# Patient Record
Sex: FEMALE | Race: NATIVE HAWAIIAN OR OTHER PACIFIC ISLANDER | NOT HISPANIC OR LATINO | Employment: UNEMPLOYED | URBAN - METROPOLITAN AREA
[De-identification: names, ages, dates, MRNs, and addresses within clinical notes are randomized per-mention and may not be internally consistent; named-entity substitution may affect disease eponyms.]

---

## 2018-08-06 ENCOUNTER — HOSPITAL ENCOUNTER (INPATIENT)
Facility: MEDICAL CENTER | Age: 48
LOS: 4 days | DRG: 871 | End: 2018-08-10
Attending: EMERGENCY MEDICINE | Admitting: INTERNAL MEDICINE
Payer: MEDICAID

## 2018-08-06 ENCOUNTER — APPOINTMENT (OUTPATIENT)
Dept: RADIOLOGY | Facility: MEDICAL CENTER | Age: 48
DRG: 871 | End: 2018-08-06
Attending: EMERGENCY MEDICINE
Payer: MEDICAID

## 2018-08-06 DIAGNOSIS — J96.01 ACUTE RESPIRATORY FAILURE WITH HYPOXIA (HCC): ICD-10-CM

## 2018-08-06 DIAGNOSIS — J14 PNEUMONIA OF BOTH LUNGS DUE TO HAEMOPHILUS INFLUENZAE, UNSPECIFIED PART OF LUNG (HCC): ICD-10-CM

## 2018-08-06 PROBLEM — E11.9 TYPE 2 DIABETES MELLITUS, WITHOUT LONG-TERM CURRENT USE OF INSULIN (HCC): Status: ACTIVE | Noted: 2018-08-06

## 2018-08-06 PROBLEM — A41.9 SEPSIS DUE TO PNEUMONIA (HCC): Status: ACTIVE | Noted: 2018-08-06

## 2018-08-06 PROBLEM — M25.552 LEFT HIP PAIN: Status: ACTIVE | Noted: 2018-08-06

## 2018-08-06 PROBLEM — I10 ESSENTIAL HYPERTENSION: Status: ACTIVE | Noted: 2018-08-06

## 2018-08-06 PROBLEM — J18.9 SEPSIS DUE TO PNEUMONIA (HCC): Status: ACTIVE | Noted: 2018-08-06

## 2018-08-06 LAB
ALBUMIN SERPL BCP-MCNC: 4.1 G/DL (ref 3.2–4.9)
ALBUMIN/GLOB SERPL: 1 G/DL
ALP SERPL-CCNC: 86 U/L (ref 30–99)
ALT SERPL-CCNC: 8 U/L (ref 2–50)
ANION GAP SERPL CALC-SCNC: 9 MMOL/L (ref 0–11.9)
AST SERPL-CCNC: 12 U/L (ref 12–45)
BASE EXCESS BLDA CALC-SCNC: 2 MMOL/L (ref -4–3)
BASOPHILS # BLD AUTO: 0.4 % (ref 0–1.8)
BASOPHILS # BLD: 0.07 K/UL (ref 0–0.12)
BILIRUB SERPL-MCNC: 1.9 MG/DL (ref 0.1–1.5)
BNP SERPL-MCNC: 26 PG/ML (ref 0–100)
BODY TEMPERATURE: 37.9 CENTIGRADE
BUN SERPL-MCNC: 10 MG/DL (ref 8–22)
CALCIUM SERPL-MCNC: 9.2 MG/DL (ref 8.5–10.5)
CHLORIDE SERPL-SCNC: 101 MMOL/L (ref 96–112)
CO2 SERPL-SCNC: 24 MMOL/L (ref 20–33)
CREAT SERPL-MCNC: 0.77 MG/DL (ref 0.5–1.4)
DEPRECATED D DIMER PPP IA-ACNC: 232 NG/ML(D-DU)
EOSINOPHIL # BLD AUTO: 0.02 K/UL (ref 0–0.51)
EOSINOPHIL NFR BLD: 0.1 % (ref 0–6.9)
ERYTHROCYTE [DISTWIDTH] IN BLOOD BY AUTOMATED COUNT: 40.6 FL (ref 35.9–50)
GLOBULIN SER CALC-MCNC: 4.1 G/DL (ref 1.9–3.5)
GLUCOSE BLD-MCNC: 137 MG/DL (ref 65–99)
GLUCOSE SERPL-MCNC: 162 MG/DL (ref 65–99)
GRAM STN SPEC: NORMAL
HCO3 BLDA-SCNC: 25 MMOL/L (ref 17–25)
HCT VFR BLD AUTO: 43.4 % (ref 37–47)
HGB BLD-MCNC: 14.4 G/DL (ref 12–16)
IMM GRANULOCYTES # BLD AUTO: 0.07 K/UL (ref 0–0.11)
IMM GRANULOCYTES NFR BLD AUTO: 0.4 % (ref 0–0.9)
INHALED O2 FLOW RATE: 4 L/MIN (ref 2–10)
LACTATE BLD-SCNC: 1.2 MMOL/L (ref 0.5–2)
LYMPHOCYTES # BLD AUTO: 1.44 K/UL (ref 1–4.8)
LYMPHOCYTES NFR BLD: 8.2 % (ref 22–41)
MCH RBC QN AUTO: 29.4 PG (ref 27–33)
MCHC RBC AUTO-ENTMCNC: 33.2 G/DL (ref 33.6–35)
MCV RBC AUTO: 88.6 FL (ref 81.4–97.8)
MONOCYTES # BLD AUTO: 0.82 K/UL (ref 0–0.85)
MONOCYTES NFR BLD AUTO: 4.6 % (ref 0–13.4)
NEUTROPHILS # BLD AUTO: 15.24 K/UL (ref 2–7.15)
NEUTROPHILS NFR BLD: 86.3 % (ref 44–72)
NRBC # BLD AUTO: 0 K/UL
NRBC BLD-RTO: 0 /100 WBC
PCO2 BLDA: 34.8 MMHG (ref 26–37)
PCO2 TEMP ADJ BLDA: 36.2 MMHG (ref 26–37)
PH BLDA: 7.47 [PH] (ref 7.4–7.5)
PH TEMP ADJ BLDA: 7.46 [PH] (ref 7.4–7.5)
PLATELET # BLD AUTO: 205 K/UL (ref 164–446)
PMV BLD AUTO: 9.7 FL (ref 9–12.9)
PO2 BLDA: 61.7 MMHG (ref 64–87)
PO2 TEMP ADJ BLDA: 65.7 MMHG (ref 64–87)
POTASSIUM SERPL-SCNC: 3.8 MMOL/L (ref 3.6–5.5)
PROCALCITONIN SERPL-MCNC: 0.07 NG/ML
PROT SERPL-MCNC: 8.2 G/DL (ref 6–8.2)
RBC # BLD AUTO: 4.9 M/UL (ref 4.2–5.4)
SAO2 % BLDA: 91.3 % (ref 93–99)
SIGNIFICANT IND 70042: NORMAL
SITE SITE: NORMAL
SODIUM SERPL-SCNC: 134 MMOL/L (ref 135–145)
SOURCE SOURCE: NORMAL
TROPONIN I SERPL-MCNC: 0.01 NG/ML (ref 0–0.04)
TSH SERPL DL<=0.005 MIU/L-ACNC: 0.51 UIU/ML (ref 0.38–5.33)
WBC # BLD AUTO: 17.7 K/UL (ref 4.8–10.8)

## 2018-08-06 PROCEDURE — 700105 HCHG RX REV CODE 258: Performed by: STUDENT IN AN ORGANIZED HEALTH CARE EDUCATION/TRAINING PROGRAM

## 2018-08-06 PROCEDURE — 85025 COMPLETE CBC W/AUTO DIFF WBC: CPT

## 2018-08-06 PROCEDURE — A9270 NON-COVERED ITEM OR SERVICE: HCPCS | Performed by: STUDENT IN AN ORGANIZED HEALTH CARE EDUCATION/TRAINING PROGRAM

## 2018-08-06 PROCEDURE — 700111 HCHG RX REV CODE 636 W/ 250 OVERRIDE (IP): Performed by: STUDENT IN AN ORGANIZED HEALTH CARE EDUCATION/TRAINING PROGRAM

## 2018-08-06 PROCEDURE — 36415 COLL VENOUS BLD VENIPUNCTURE: CPT

## 2018-08-06 PROCEDURE — 84443 ASSAY THYROID STIM HORMONE: CPT

## 2018-08-06 PROCEDURE — 96365 THER/PROPH/DIAG IV INF INIT: CPT

## 2018-08-06 PROCEDURE — 700111 HCHG RX REV CODE 636 W/ 250 OVERRIDE (IP): Performed by: EMERGENCY MEDICINE

## 2018-08-06 PROCEDURE — 87040 BLOOD CULTURE FOR BACTERIA: CPT

## 2018-08-06 PROCEDURE — 700102 HCHG RX REV CODE 250 W/ 637 OVERRIDE(OP): Performed by: STUDENT IN AN ORGANIZED HEALTH CARE EDUCATION/TRAINING PROGRAM

## 2018-08-06 PROCEDURE — 87070 CULTURE OTHR SPECIMN AEROBIC: CPT

## 2018-08-06 PROCEDURE — 99223 1ST HOSP IP/OBS HIGH 75: CPT | Mod: GC | Performed by: INTERNAL MEDICINE

## 2018-08-06 PROCEDURE — 99285 EMERGENCY DEPT VISIT HI MDM: CPT

## 2018-08-06 PROCEDURE — 82803 BLOOD GASES ANY COMBINATION: CPT

## 2018-08-06 PROCEDURE — 71045 X-RAY EXAM CHEST 1 VIEW: CPT

## 2018-08-06 PROCEDURE — 83605 ASSAY OF LACTIC ACID: CPT

## 2018-08-06 PROCEDURE — 93005 ELECTROCARDIOGRAM TRACING: CPT | Performed by: EMERGENCY MEDICINE

## 2018-08-06 PROCEDURE — 87205 SMEAR GRAM STAIN: CPT

## 2018-08-06 PROCEDURE — 304561 HCHG STAT O2

## 2018-08-06 PROCEDURE — 82962 GLUCOSE BLOOD TEST: CPT

## 2018-08-06 PROCEDURE — 770020 HCHG ROOM/CARE - TELE (206)

## 2018-08-06 PROCEDURE — 84145 PROCALCITONIN (PCT): CPT

## 2018-08-06 PROCEDURE — 84484 ASSAY OF TROPONIN QUANT: CPT

## 2018-08-06 PROCEDURE — 94760 N-INVAS EAR/PLS OXIMETRY 1: CPT

## 2018-08-06 PROCEDURE — 87077 CULTURE AEROBIC IDENTIFY: CPT

## 2018-08-06 PROCEDURE — 85379 FIBRIN DEGRADATION QUANT: CPT

## 2018-08-06 PROCEDURE — 83036 HEMOGLOBIN GLYCOSYLATED A1C: CPT

## 2018-08-06 PROCEDURE — 80053 COMPREHEN METABOLIC PANEL: CPT

## 2018-08-06 PROCEDURE — 700105 HCHG RX REV CODE 258: Performed by: EMERGENCY MEDICINE

## 2018-08-06 PROCEDURE — 96367 TX/PROPH/DG ADDL SEQ IV INF: CPT

## 2018-08-06 PROCEDURE — 83880 ASSAY OF NATRIURETIC PEPTIDE: CPT

## 2018-08-06 RX ORDER — LABETALOL HYDROCHLORIDE 5 MG/ML
10 INJECTION, SOLUTION INTRAVENOUS EVERY 4 HOURS PRN
Status: DISCONTINUED | OUTPATIENT
Start: 2018-08-06 | End: 2018-08-10 | Stop reason: HOSPADM

## 2018-08-06 RX ORDER — SODIUM CHLORIDE 9 MG/ML
INJECTION, SOLUTION INTRAVENOUS CONTINUOUS
Status: DISCONTINUED | OUTPATIENT
Start: 2018-08-06 | End: 2018-08-08

## 2018-08-06 RX ORDER — AMOXICILLIN 250 MG
2 CAPSULE ORAL 2 TIMES DAILY
Status: DISCONTINUED | OUTPATIENT
Start: 2018-08-06 | End: 2018-08-10 | Stop reason: HOSPADM

## 2018-08-06 RX ORDER — SODIUM CHLORIDE 9 MG/ML
1000 INJECTION, SOLUTION INTRAVENOUS
Status: COMPLETED | OUTPATIENT
Start: 2018-08-06 | End: 2018-08-06

## 2018-08-06 RX ORDER — ACETAMINOPHEN 325 MG/1
650 TABLET ORAL EVERY 6 HOURS PRN
Status: DISCONTINUED | OUTPATIENT
Start: 2018-08-06 | End: 2018-08-10 | Stop reason: HOSPADM

## 2018-08-06 RX ORDER — INSULIN GLARGINE 100 [IU]/ML
5 INJECTION, SOLUTION SUBCUTANEOUS EVERY EVENING
Status: DISCONTINUED | OUTPATIENT
Start: 2018-08-06 | End: 2018-08-06

## 2018-08-06 RX ORDER — IBUPROFEN 200 MG
400 TABLET ORAL EVERY 6 HOURS PRN
COMMUNITY

## 2018-08-06 RX ORDER — LISINOPRIL 5 MG/1
10 TABLET ORAL
Status: DISCONTINUED | OUTPATIENT
Start: 2018-08-06 | End: 2018-08-07

## 2018-08-06 RX ORDER — LISINOPRIL 10 MG/1
TABLET ORAL DAILY
Status: ON HOLD | COMMUNITY
End: 2018-08-10

## 2018-08-06 RX ORDER — BISACODYL 10 MG
10 SUPPOSITORY, RECTAL RECTAL
Status: DISCONTINUED | OUTPATIENT
Start: 2018-08-06 | End: 2018-08-10 | Stop reason: HOSPADM

## 2018-08-06 RX ORDER — AZITHROMYCIN 250 MG/1
250 TABLET, FILM COATED ORAL DAILY
Status: DISCONTINUED | OUTPATIENT
Start: 2018-08-07 | End: 2018-08-08

## 2018-08-06 RX ORDER — DEXTROSE MONOHYDRATE 25 G/50ML
25 INJECTION, SOLUTION INTRAVENOUS
Status: DISCONTINUED | OUTPATIENT
Start: 2018-08-06 | End: 2018-08-10 | Stop reason: HOSPADM

## 2018-08-06 RX ORDER — AZITHROMYCIN 500 MG/1
500 INJECTION, POWDER, LYOPHILIZED, FOR SOLUTION INTRAVENOUS ONCE
Status: COMPLETED | OUTPATIENT
Start: 2018-08-06 | End: 2018-08-06

## 2018-08-06 RX ORDER — POLYETHYLENE GLYCOL 3350 17 G/17G
1 POWDER, FOR SOLUTION ORAL
Status: DISCONTINUED | OUTPATIENT
Start: 2018-08-06 | End: 2018-08-10 | Stop reason: HOSPADM

## 2018-08-06 RX ADMIN — SODIUM CHLORIDE 1000 ML: 9 INJECTION, SOLUTION INTRAVENOUS at 12:11

## 2018-08-06 RX ADMIN — AZITHROMYCIN MONOHYDRATE 500 MG: 500 INJECTION, POWDER, LYOPHILIZED, FOR SOLUTION INTRAVENOUS at 12:53

## 2018-08-06 RX ADMIN — SODIUM CHLORIDE: 9 INJECTION, SOLUTION INTRAVENOUS at 16:09

## 2018-08-06 RX ADMIN — ENOXAPARIN SODIUM 40 MG: 100 INJECTION SUBCUTANEOUS at 16:10

## 2018-08-06 RX ADMIN — SODIUM CHLORIDE 3 G: 900 INJECTION INTRAVENOUS at 23:15

## 2018-08-06 RX ADMIN — SODIUM CHLORIDE 3 G: 900 INJECTION INTRAVENOUS at 18:11

## 2018-08-06 RX ADMIN — SODIUM CHLORIDE 3 G: 900 INJECTION INTRAVENOUS at 12:10

## 2018-08-06 RX ADMIN — LISINOPRIL 10 MG: 5 TABLET ORAL at 18:06

## 2018-08-06 ASSESSMENT — ENCOUNTER SYMPTOMS
ABDOMINAL PAIN: 0
TINGLING: 1
SPUTUM PRODUCTION: 1
VOMITING: 0
CONSTIPATION: 0
BRUISES/BLEEDS EASILY: 0
SHORTNESS OF BREATH: 1
CHILLS: 1
COUGH: 1
DIARRHEA: 0
NAUSEA: 0
FEVER: 1
PALPITATIONS: 0
FALLS: 0
ORTHOPNEA: 1
BLOOD IN STOOL: 0
WEAKNESS: 1
DIAPHORESIS: 1

## 2018-08-06 ASSESSMENT — PAIN SCALES - GENERAL
PAINLEVEL_OUTOF10: 0
PAINLEVEL_OUTOF10: 0

## 2018-08-06 ASSESSMENT — COPD QUESTIONNAIRES
DURING THE PAST 4 WEEKS HOW MUCH DID YOU FEEL SHORT OF BREATH: SOME OF THE TIME
HAVE YOU SMOKED AT LEAST 100 CIGARETTES IN YOUR ENTIRE LIFE: YES
COPD SCREENING SCORE: 6
DO YOU EVER COUGH UP ANY MUCUS OR PHLEGM?: YES, A FEW DAYS A WEEK OR MONTH

## 2018-08-06 ASSESSMENT — LIFESTYLE VARIABLES
EVER_SMOKED: YES
EVER_SMOKED: YES

## 2018-08-06 NOTE — ASSESSMENT & PLAN NOTE
-HbA1C 7.1  -Lipid panel (, TGL 67, HDL 30, LDL 64)   -patient has been off medications for the past three months   -continue Metformin 500mg BID  -follow up outpt

## 2018-08-06 NOTE — PROGRESS NOTES
1600: Pt received from ED on 4L NC.  AOx4, denies any pain at this time.  Discussed home med rec--pt states she moved to the [facility] a few months ago and she ran out of her medication and is having issues getting her medications transferred to [place].  She was taking lisinopril and metformin (unsure of dosage for both). Pt oriented to room.  Call light and personal belongings within reach.  Pt educated to call for assistance.

## 2018-08-06 NOTE — H&P
"      Internal Medicine Admitting History and Physical    Note Author: Ryan Gonzalez D.O.       Name Georgiana Michael 1970   Age/Sex 48 y.o. female   MRN [id number]   Code Status Full Code     After 5PM or if no immediate response to page, please call for cross-coverage  Attending/Team: Dr. Bethea/West  See Patient List for primary contact information  Call [phone] to page    1st Call - Day Intern (R1):   Dr. Gonzalez  2nd Call - Day Sr. Resident (R2/R3):   Dr. Shay       Chief Complaint:   Worsening shortness of breath x 1 week and cough x 3 weeks     HPI:  Ms. Michael is a 48 year-old female who presented to the ED with a three week history of worsening cough and a one week history of progressive dyspnea at rest. Patient states that her cough is productive with yellow sputum and occasional bloody specks. Patient admits to pleuritic (dull, achy) chest pain over her left breast which is radiating into her left arm and shoulder since. Patient does not use home oxygen and had to use her brother's oxygen last night because of the dyspnea without any relief of symptoms. On review of systems, pt admits to subjective fevers, chills, and orthopnea for the last two weeks. She has also had decreased appetite for the past couple of days. Patient denies nausea, vomiting and palpitations. Patient states she started to get left hip pain yesterday as well, but denies any recent falls and trauma. She thinks it's because she's been bedridden due to her illness for the past couple of days.     Patient has a past medical history of non-insulin dependent Diabetes Mellitus Type II and Hypertension. However, patient \"does not like taking medications\" and has been off her home medications since she moved from [place] in March.     In the ED, patient was found to have the following:   HR of 103, RR of 26 and saturating at 90% on 4L O2.   Labs: WBC 17.7 and lactate-1.2   CXR: unremarkable  In the ED, patient received a duo-nebs " treatment, 1L NS bolus, one dose of Unasyn and Azithromycin.     Review of Systems   Constitutional: Positive for chills, diaphoresis, fever and malaise/fatigue.        Subjective   Respiratory: Positive for cough, sputum production and shortness of breath.    Cardiovascular: Positive for chest pain and orthopnea. Negative for palpitations and leg swelling.        Pleuritic chest pain   Gastrointestinal: Negative for abdominal pain, blood in stool, constipation, diarrhea, melena, nausea and vomiting.   Genitourinary: Negative for dysuria, frequency, hematuria and urgency.        Has Mirena   Musculoskeletal: Positive for joint pain. Negative for falls.        Left hip pain   Skin: Negative for itching and rash.   Neurological: Positive for tingling and weakness.        Occasional numbness and tingling in hands and feet bilaterally   Endo/Heme/Allergies: Does not bruise/bleed easily.          Past Medical History (Chronic medical problem, known complications and current treatment)    Diabetes Mellitus-non insulin dependent with neuropathy, was only on Metformin (unknown dose)  Hypertension- was on Lisinopril (unknown dose)  Hasn't taken any medications     Past Surgical History:  History reviewed. No pertinent surgical history.       Current Outpatient Medications:  Home Medications     Reviewed by Harsha Callaway (Pharmacy Tech) on 08/06/18 at 1128  Med List Status: Complete   Medication Last Dose Status   ibuprofen (MOTRIN) 200 MG Tab 8/5/2018 Active                Medication Allergy/Sensitivities:  No Known Allergies      Family History (mandatory)   Ovarian cancer-unknown relative, patient too distressed at the time of encounter    Social History (mandatory)   Patient recently moved from [place] to [place] in March. Has been off medications since then.Tobacco: 1 ppd x 30 years   ETOH: denies   Recreational drugs: denies    Social History     Social History   • Marital status:      Spouse name: N/A   •  "Number of children: N/A   • Years of education: N/A     Occupational History   • Not on file.     Social History Main Topics   • Smoking status: Current Every Day Smoker     Packs/day: 1.00   • Smokeless tobacco: Never Used   • Alcohol use No   • Drug use: No   • Sexual activity: Not on file     Other Topics Concern   • Not on file     Social History Narrative   • No narrative on file     Living situation:  Lives in [place], with her kids. She was able to complete her activities of daily living prior to sickness.     PCP : No primary care provider on file.    Physical Exam     Vitals:    08/06/18 1300 08/06/18 1400 08/06/18 1430 08/06/18 1500   BP:       Pulse: 86  80 88   Resp: (!) 24 (!) 22 (!) 22 (!) 22   Temp:       SpO2: 91%  91% 93%   Weight:       Height:         Body mass index is 60.55 kg/m².  /78   Pulse 88   Temp 37.2 °C (98.9 °F)   Resp (!) 22   Ht 1.753 m (5' 9\")   Wt (!) 186 kg (410 lb)   SpO2 93%   BMI 60.55 kg/m²   O2 therapy: Pulse Oximetry: 93 %, O2 (LPM): 4    Physical Exam   Constitutional: She is oriented to person, place, and time. She appears distressed.   Very large body habitus   HENT:   Head: Normocephalic and atraumatic.   Dry mucous membranes, Mallampati score IV   Eyes: No scleral icterus.   Neck: No tracheal deviation present. No thyromegaly present.   +left sided cervical lymphadenopathy   Cardiovascular: Intact distal pulses.    No murmur heard.  Tachycardic, regular rhythm. No lower extremity edema   Pulmonary/Chest: She is in respiratory distress. She has wheezes. She has no rales. She exhibits no tenderness.   Yellow sputum in emesis bag. Speaking in short sentences, wheezes bilaterally in lower lung bases   Abdominal: Soft. Bowel sounds are normal. She exhibits no distension. There is no tenderness. There is no rebound and no guarding.   Musculoskeletal: Normal range of motion.   Able to flex left hip, tenderness to palpation in left lateral hip   Neurological: She is " alert and oriented to person, place, and time.   Skin: Skin is warm. She is diaphoretic.     QTc: 458    Data Review       Old Records Request:   Deferred  Current Records review/summary: Completed    Lab Data Review:  Recent Results (from the past 24 hour(s))   Lactic acid (lactate)    Collection Time: 08/06/18 10:40 AM   Result Value Ref Range    Lactic Acid 1.2 0.5 - 2.0 mmol/L   CBC WITH DIFFERENTIAL    Collection Time: 08/06/18 10:40 AM   Result Value Ref Range    WBC 17.7 (H) 4.8 - 10.8 K/uL    RBC 4.90 4.20 - 5.40 M/uL    Hemoglobin 14.4 12.0 - 16.0 g/dL    Hematocrit 43.4 37.0 - 47.0 %    MCV 88.6 81.4 - 97.8 fL    MCH 29.4 27.0 - 33.0 pg    MCHC 33.2 (L) 33.6 - 35.0 g/dL    RDW 40.6 35.9 - 50.0 fL    Platelet Count 205 164 - 446 K/uL    MPV 9.7 9.0 - 12.9 fL    Neutrophils-Polys 86.30 (H) 44.00 - 72.00 %    Lymphocytes 8.20 (L) 22.00 - 41.00 %    Monocytes 4.60 0.00 - 13.40 %    Eosinophils 0.10 0.00 - 6.90 %    Basophils 0.40 0.00 - 1.80 %    Immature Granulocytes 0.40 0.00 - 0.90 %    Nucleated RBC 0.00 /100 WBC    Neutrophils (Absolute) 15.24 (H) 2.00 - 7.15 K/uL    Lymphs (Absolute) 1.44 1.00 - 4.80 K/uL    Monos (Absolute) 0.82 0.00 - 0.85 K/uL    Eos (Absolute) 0.02 0.00 - 0.51 K/uL    Baso (Absolute) 0.07 0.00 - 0.12 K/uL    Immature Granulocytes (abs) 0.07 0.00 - 0.11 K/uL    NRBC (Absolute) 0.00 K/uL   COMP METABOLIC PANEL    Collection Time: 08/06/18 10:40 AM   Result Value Ref Range    Sodium 134 (L) 135 - 145 mmol/L    Potassium 3.8 3.6 - 5.5 mmol/L    Chloride 101 96 - 112 mmol/L    Co2 24 20 - 33 mmol/L    Anion Gap 9.0 0.0 - 11.9    Glucose 162 (H) 65 - 99 mg/dL    Bun 10 8 - 22 mg/dL    Creatinine 0.77 0.50 - 1.40 mg/dL    Calcium 9.2 8.5 - 10.5 mg/dL    AST(SGOT) 12 12 - 45 U/L    ALT(SGPT) 8 2 - 50 U/L    Alkaline Phosphatase 86 30 - 99 U/L    Total Bilirubin 1.9 (H) 0.1 - 1.5 mg/dL    Albumin 4.1 3.2 - 4.9 g/dL    Total Protein 8.2 6.0 - 8.2 g/dL    Globulin 4.1 (H) 1.9 - 3.5 g/dL    A-G  Ratio 1.0 g/dL   ESTIMATED GFR    Collection Time: 18 10:40 AM   Result Value Ref Range    GFR If African American >60 >60 mL/min/1.73 m 2    GFR If Non African American >60 >60 mL/min/1.73 m 2   D-DIMER    Collection Time: 18 10:40 AM   Result Value Ref Range    D-Dimer Screen 232 <250 ng/mL(D-DU)   BTYPE NATRIURETIC PEPTIDE    Collection Time: 18 10:40 AM   Result Value Ref Range    B Natriuretic Peptide 26 0 - 100 pg/mL   TROPONIN    Collection Time: 18 10:40 AM   Result Value Ref Range    Troponin I 0.01 0.00 - 0.04 ng/mL   TSH (Thyroid Stimulating Hormone)    Collection Time: 18 10:40 AM   Result Value Ref Range    TSH 0.510 0.380 - 5.330 uIU/mL   EKG (ER)    Collection Time: 18 12:29 PM   Result Value Ref Range    Report       [facility] Emergency Dept[facility]    Test Date:  2018  Pt Name:    COLTEN BENNETT                Department: ER  MRN:        [id number]                      Room:       [facility]  Gender:     Female                       Technician: [id number]  :        1970                   Requested By:DARIA ALVES  Order #:    [id number]                    Reading MD:    Measurements  Intervals                                Axis  Rate:       82                           P:          -17  ME:         156                          QRS:        42  QRSD:       92                           T:          -1  QT:         392  QTc:        458    Interpretive Statements  SINUS RHYTHM  EARLY PRECORDIAL R/S TRANSITION  BORDERLINE T ABNORMALITIES, INFERIOR LEADS  No previous ECG available for comparison         Imaging/Procedures Review:    Independant Imaging Review: Completed  DX-CHEST-PORTABLE (1 VIEW)   Final Result         No acute cardiac or pulmonary abnormality is identified.      CT-CHEST (THORAX) WITH    (Results Pending)     EKG:   EKG Independant Review: Completed  QTc:458, HR: 82, Normal Sinus Rhythm    Records reviewed and summarized in current  documentation :  Yes  [employer] teaching service handout given to patient:  No         Assessment/Plan     Sepsis due to pneumonia (HCC) without end organ damage  -Patient tachycardic, tachypnic and has WBC- 17.7 (SIRS 3/4)   -CXR is unremarkable but signs and symptoms of community acquired pneumonia   Plan   -Admit to Telemetry   -IVF NS 75 mL/hr (5.5 L required- 1 L received in ED, will reassess tomorrow)   -Unasyn and Azithromycin   -f/u on respiratory culture and Procalcitonin  -CT Chest- to check for any abscess given subacute history  -f/u on UA     Acute respiratory failure with hypoxia (HCC)  -secondary to suspected pneumonia   -continue to titrate oxygen to saturations >94%    Type 2 diabetes mellitus, without long-term current use of insulin (HCC)  -check HbA1c, Lipid panel  -patient has been off medications for the past three month   -will check accu checks to evaluate need for insulin during inpatient stay    Essential hypertension  -elevated BP in the ED  -started Lisinopril 10 mg and will reevaluate     Left hip pain  -patient is able to move leg, suspect secondary to body habitus and being bedridden  -will continue to monitor, if worsens will obtain imaging    Anticipated Hospital stay:  >2 midnights    Quality Measures  Quality-Core Measures   Reviewed items::  EKG reviewed, Labs reviewed, Medications reviewed and Radiology images reviewed  Baker catheter::  No Baker  DVT prophylaxis pharmacological::  Enoxaparin (Lovenox)    PCP: No primary care provider on file.

## 2018-08-06 NOTE — ASSESSMENT & PLAN NOTE
This is sepsis (without associated acute organ dysfunction).   -SIRS 3/4 on admission  -CT Chest: pneumonitis, right greater than left.  - Augmentin antibiotic therapy, Day 5/7   -now resolved

## 2018-08-06 NOTE — SENIOR ADMIT NOTE
Senior Admission Note    In summary: eGorgiana Michael is a 48 y.o. female with past medical history of DM II and HTN presented to the ER with worsening cough for the past 3 weeks and SOB for the past week.     Patient reported that she started having productive cough 3 weeks and ago. She reported green-yellow sputum and occasional blood specks. She also noticed subjective fever and chills. Then one week ago she started having progressive SOB and the day before admission she needed to use her brother's oxygen. Patient also has orthopnea and pleuritic chest pain that radiates to the left shoulder and arm. Patient denies abdominal pain, nausea, vomiting, diarrhea, palpitations, recent travel, sick contacts.     She also reported mild left hip pain. She denies any recent falls or being unable to move her left LE. Patient has been bedridden since symptoms started.     Patient has history of HTN and DM II. She recently move here from [facility] and has not being using her medications since she moved in March.       Pertinent physical exam findings:    Vitals:    08/06/18 1300 08/06/18 1400 08/06/18 1430 08/06/18 1500   BP:       Pulse: 86  80 88   Resp: (!) 24 (!) 22 (!) 22 (!) 22   Temp:       SpO2: 91%  91% 93%   Weight:       Height:         Physical Exam   Constitutional:  oriented to person, place, and time. Distressed   HEENT: Dry oral mucosa   Eyes: EOMI, SHANTANU   Cardiovascular:Tachycardic, no murmurs   Lungs:Increased respiratory effort, tachypnic, speaking in short sentences. Wheezing and crackles on bilateral lower bases. No rales.  Abdomen: Bowel sounds normal, Soft, No tenderness  Skin: No erythema, No rash  Lower limbs: normal, no pitting edema, normal range of motion. Mild tenderness upon palpation of the left hip   Neurologic: Alert & oriented x 3,  No focal deficits noted, cranial nerves II through XII are normal  PSY: stable mood.     Pertinent studies:   CXR: unremarkable   WBC 17. 7   Lactic acid 1.2  In the  ED patient received Unasyn and azithromycin.        Assessment and plan in summary:    #Sepsis secondary to Pneumonia   # Acute respiratory failure with Hypoxia   - patient presented with 3 weeks of progressive productive cough and one week of SOB   - on evaluation SIRS 3/4 - tachycardic, tachypnea and elevated WBC   - d-dimer negative, BNP WNL   Plan   - admitted to telemetry   - IV fluids   - Unasyn and azithromycin   - CT chest with contrast - pending. For evaluation of Pneumonia vs abscess   - respiratory cultures and blood cultures - pending   - procalcitonin pending   - RT protocol   - pulse ox and supplemental oxygen to keep saturation above 94%    #DM II   - Patient was on metformin, but patient has not being using medications since March   - Her blood glucose was 162   - accu checks   - if patient blood glucose continues to increase will start Lantus 5 in the evening   - check A1c and lipid profile     #HTN   - patient BP was elevated in the ED   - she was using lisinopril, but nothing since March   - will start lisinopril 10mg daily   - PRN medication ordered for SBP > 180     #Left hip pain   - patient reported mild pain on left hip  - she is able to move all her extremities   - will continue to monitor, if increase pain will consider imaging       For full plan, please see Intern note for details   Bettina Cee M.D.  PGY 2

## 2018-08-06 NOTE — ED NOTES
Chief Complaint   Patient presents with   • Shortness of Breath     x3wks   • Cough     Pt bib ems, c/o sob and cough x3wks. Productive cough. She was given 2duoneb and albuterol tx pta. Increase work of breathing noted.  Pt has history of DM and HTN, has been off meds since 03/2018. Also c/o left hip pain, denies trauma.  Protocol intiated,blood sent to lab

## 2018-08-06 NOTE — ASSESSMENT & PLAN NOTE
-secondary to pneumonia   -continue to titrate oxygen to saturations >94%  -encourage ambulation and incentive spirometry  -repeat ambulation test today showing patient desaturating to 84% with ambulation without supplemental oxygen

## 2018-08-06 NOTE — ED PROVIDER NOTES
CHIEF COMPLAINT  Chief Complaint   Patient presents with   • Shortness of Breath     x3wks   • Cough       HPI  HPI   48 y.o. F p/w CC of cough x 3 weeks  Pt states that she     Pt is from [place] but denies international travel  Pt is accompanied by daughter  Pt denies recent hospital admission or immunocompromised status.       REVIEW OF SYSTEMS  Review of Systems   Constitutional: Negative.  Negative for fever.   HENT: Negative.  Negative for ear pain and sore throat.    Eyes: Negative.  Negative for pain.   Respiratory: Positive for cough, sputum production and shortness of breath. Negative for hemoptysis and wheezing.    Cardiovascular: Negative.  Negative for chest pain, palpitations and leg swelling.   Gastrointestinal: Negative.  Negative for abdominal pain, blood in stool, diarrhea and vomiting.   Genitourinary: Negative for dysuria and flank pain.   Musculoskeletal: Negative for back pain, myalgias and neck pain.   Skin: Negative.  Negative for rash.   Neurological: Negative for focal weakness, seizures, weakness and headaches.   Endo/Heme/Allergies: Does not bruise/bleed easily.   Psychiatric/Behavioral: Negative for hallucinations and suicidal ideas.   All other systems reviewed and are negative.      PAST MEDICAL HISTORY   has a past medical history of Diabetes (HCC) and Hypertension.    SOCIAL HISTORY  Social History     Social History Main Topics   • Smoking status: Current Every Day Smoker     Packs/day: 1.00   • Smokeless tobacco: Never Used   • Alcohol use No   • Drug use: No   • Sexual activity: Not on file       SURGICAL HISTORY  patient denies any surgical history    CURRENT MEDICATIONS  Home Medications     Reviewed by Harsha Callaway (Pharmacy Tech) on 08/06/18 at 1128  Med List Status: Complete   Medication Last Dose Status   ibuprofen (MOTRIN) 200 MG Tab 8/5/2018 Active                ALLERGIES  No Known Allergies    PHYSICAL EXAM  VITAL SIGNS: BP (!) 167/81 Comment: Nurse notified.   "Pulse 81   Temp 37.3 °C (99.1 °F)   Resp 18   Ht 1.753 m (5' 9\")   Wt (!) 183.4 kg (404 lb 5.2 oz)   SpO2 92%   Breastfeeding? No   BMI 59.71 kg/m²  2L o2 Pulse ox interpretation: I interpret this pulse ox as abnormal given )2 requirement.     Physical Exam   Constitutional: She is oriented to person, place, and time and well-developed, well-nourished, and in no distress.   HENT:   Head: Normocephalic and atraumatic.   Right Ear: External ear normal.   Left Ear: External ear normal.   Eyes: Conjunctivae and EOM are normal. No scleral icterus.   Neck: Normal range of motion.   Cardiovascular: Normal rate.    Pulmonary/Chest: Effort normal. No stridor. No respiratory distress. She has no wheezes.   Diminished breath sounds b/l at bases   Abdominal: Soft. She exhibits no distension. There is no tenderness.   Musculoskeletal: Normal range of motion. She exhibits no edema or deformity.   Neurological: She is alert and oriented to person, place, and time. Coordination normal.   Skin: Skin is warm and dry. No rash noted. No erythema.   Psychiatric: Affect and judgment normal.       DIAGNOSTIC STUDIES / PROCEDURES    LABS/EKG  Results for orders placed or performed during the hospital encounter of 08/06/18   Lactic acid (lactate)   Result Value Ref Range    Lactic Acid 1.2 0.5 - 2.0 mmol/L   CBC WITH DIFFERENTIAL   Result Value Ref Range    WBC 17.7 (H) 4.8 - 10.8 K/uL    RBC 4.90 4.20 - 5.40 M/uL    Hemoglobin 14.4 12.0 - 16.0 g/dL    Hematocrit 43.4 37.0 - 47.0 %    MCV 88.6 81.4 - 97.8 fL    MCH 29.4 27.0 - 33.0 pg    MCHC 33.2 (L) 33.6 - 35.0 g/dL    RDW 40.6 35.9 - 50.0 fL    Platelet Count 205 164 - 446 K/uL    MPV 9.7 9.0 - 12.9 fL    Neutrophils-Polys 86.30 (H) 44.00 - 72.00 %    Lymphocytes 8.20 (L) 22.00 - 41.00 %    Monocytes 4.60 0.00 - 13.40 %    Eosinophils 0.10 0.00 - 6.90 %    Basophils 0.40 0.00 - 1.80 %    Immature Granulocytes 0.40 0.00 - 0.90 %    Nucleated RBC 0.00 /100 WBC    Neutrophils " (Absolute) 15.24 (H) 2.00 - 7.15 K/uL    Lymphs (Absolute) 1.44 1.00 - 4.80 K/uL    Monos (Absolute) 0.82 0.00 - 0.85 K/uL    Eos (Absolute) 0.02 0.00 - 0.51 K/uL    Baso (Absolute) 0.07 0.00 - 0.12 K/uL    Immature Granulocytes (abs) 0.07 0.00 - 0.11 K/uL    NRBC (Absolute) 0.00 K/uL   COMP METABOLIC PANEL   Result Value Ref Range    Sodium 134 (L) 135 - 145 mmol/L    Potassium 3.8 3.6 - 5.5 mmol/L    Chloride 101 96 - 112 mmol/L    Co2 24 20 - 33 mmol/L    Anion Gap 9.0 0.0 - 11.9    Glucose 162 (H) 65 - 99 mg/dL    Bun 10 8 - 22 mg/dL    Creatinine 0.77 0.50 - 1.40 mg/dL    Calcium 9.2 8.5 - 10.5 mg/dL    AST(SGOT) 12 12 - 45 U/L    ALT(SGPT) 8 2 - 50 U/L    Alkaline Phosphatase 86 30 - 99 U/L    Total Bilirubin 1.9 (H) 0.1 - 1.5 mg/dL    Albumin 4.1 3.2 - 4.9 g/dL    Total Protein 8.2 6.0 - 8.2 g/dL    Globulin 4.1 (H) 1.9 - 3.5 g/dL    A-G Ratio 1.0 g/dL   URINALYSIS   Result Value Ref Range    Color DK Yellow     Character Clear     Specific Gravity 1.024 <1.035    Ph 6.5 5.0 - 8.0    Glucose Negative Negative mg/dL    Ketones Negative Negative mg/dL    Protein Negative Negative mg/dL    Bilirubin Small (A) Negative    Urobilinogen, Urine >=8.0 Negative    Nitrite Negative Negative    Leukocyte Esterase Negative Negative    Occult Blood Negative Negative    Micro Urine Req see below    BLOOD CULTURE   Result Value Ref Range    Significant Indicator NEG     Source BLD     Site PERIPHERAL     Blood Culture       No Growth    Note: Blood cultures are incubated for 5 days and  are monitored continuously.Positive blood cultures  are called to the RN and reported as soon as  they are identified.     BLOOD CULTURE   Result Value Ref Range    Significant Indicator NEG     Source BLD     Site PERIPHERAL     Blood Culture       No Growth    Note: Blood cultures are incubated for 5 days and  are monitored continuously.Positive blood cultures  are called to the RN and reported as soon as  they are identified.      ESTIMATED GFR   Result Value Ref Range    GFR If African American >60 >60 mL/min/1.73 m 2    GFR If Non African American >60 >60 mL/min/1.73 m 2   D-DIMER   Result Value Ref Range    D-Dimer Screen 232 <250 ng/mL(D-DU)   BTYPE NATRIURETIC PEPTIDE   Result Value Ref Range    B Natriuretic Peptide 26 0 - 100 pg/mL   TROPONIN   Result Value Ref Range    Troponin I 0.01 0.00 - 0.04 ng/mL   TSH (Thyroid Stimulating Hormone)   Result Value Ref Range    TSH 0.510 0.380 - 5.330 uIU/mL   GRAM STAIN   Result Value Ref Range    Significant Indicator .     Source RESP     Site SPUTUM     Gram Stain Result       Many WBCs.  Many Gram tiny negative rods.  Few Gram positive cocci.  Few Gram positive rods.  Specimen Quality Score: 3+                                                                                                                 Lipid Profile (Lipid Panel) Fasting   Result Value Ref Range    Cholesterol,Tot 107 100 - 199 mg/dL    Triglycerides 67 0 - 149 mg/dL    HDL 30 (A) >=40 mg/dL    LDL 64 <100 mg/dL   CBC WITH DIFFERENTIAL   Result Value Ref Range    WBC 12.3 (H) 4.8 - 10.8 K/uL    RBC 4.52 4.20 - 5.40 M/uL    Hemoglobin 13.7 12.0 - 16.0 g/dL    Hematocrit 40.6 37.0 - 47.0 %    MCV 89.8 81.4 - 97.8 fL    MCH 30.3 27.0 - 33.0 pg    MCHC 33.7 33.6 - 35.0 g/dL    RDW 42.2 35.9 - 50.0 fL    Platelet Count 186 164 - 446 K/uL    MPV 10.2 9.0 - 12.9 fL    Neutrophils-Polys 76.80 (H) 44.00 - 72.00 %    Lymphocytes 14.60 (L) 22.00 - 41.00 %    Monocytes 7.30 0.00 - 13.40 %    Eosinophils 0.60 0.00 - 6.90 %    Basophils 0.20 0.00 - 1.80 %    Immature Granulocytes 0.50 0.00 - 0.90 %    Nucleated RBC 0.00 /100 WBC    Neutrophils (Absolute) 9.45 (H) 2.00 - 7.15 K/uL    Lymphs (Absolute) 1.79 1.00 - 4.80 K/uL    Monos (Absolute) 0.90 (H) 0.00 - 0.85 K/uL    Eos (Absolute) 0.07 0.00 - 0.51 K/uL    Baso (Absolute) 0.03 0.00 - 0.12 K/uL    Immature Granulocytes (abs) 0.06 0.00 - 0.11 K/uL    NRBC (Absolute) 0.00 K/uL   COMP  METABOLIC PANEL   Result Value Ref Range    Sodium 136 135 - 145 mmol/L    Potassium 3.7 3.6 - 5.5 mmol/L    Chloride 104 96 - 112 mmol/L    Co2 24 20 - 33 mmol/L    Anion Gap 8.0 0.0 - 11.9    Glucose 178 (H) 65 - 99 mg/dL    Bun 12 8 - 22 mg/dL    Creatinine 0.67 0.50 - 1.40 mg/dL    Calcium 8.7 8.5 - 10.5 mg/dL    AST(SGOT) 19 12 - 45 U/L    ALT(SGPT) 14 2 - 50 U/L    Alkaline Phosphatase 88 30 - 99 U/L    Total Bilirubin 1.7 (H) 0.1 - 1.5 mg/dL    Albumin 3.6 3.2 - 4.9 g/dL    Total Protein 7.5 6.0 - 8.2 g/dL    Globulin 3.9 (H) 1.9 - 3.5 g/dL    A-G Ratio 0.9 g/dL   MAGNESIUM   Result Value Ref Range    Magnesium 1.9 1.5 - 2.5 mg/dL   PHOSPHORUS   Result Value Ref Range    Phosphorus 2.8 2.5 - 4.5 mg/dL   ESTIMATED GFR   Result Value Ref Range    GFR If African American >60 >60 mL/min/1.73 m 2    GFR If Non African American >60 >60 mL/min/1.73 m 2   ACCU-CHEK GLUCOSE   Result Value Ref Range    Glucose - Accu-Ck 137 (H) 65 - 99 mg/dL   EKG (ER)   Result Value Ref Range    Report       [facility] Emergency Dept.    Test Date:  2018  Pt Name:    COLTEN BENNETT                Department: ER  MRN:        [id number]                      Room:        04  Gender:     Female                       Technician: [id number]  :        1970                   Requested By:DARIA ALVES  Order #:    [id number]                    Reading MD:    Measurements  Intervals                                Axis  Rate:       82                           P:          -17  TN:         156                          QRS:        42  QRSD:       92                           T:          -1  QT:         392  QTc:        458    Interpretive Statements  SINUS RHYTHM  EARLY PRECORDIAL R/S TRANSITION  BORDERLINE T ABNORMALITIES, INFERIOR LEADS  No previous ECG available for comparison         RADIOLOGY  DX-CHEST-PORTABLE (1 VIEW)   Final Result         No acute cardiac or pulmonary abnormality is identified.      CT-CHEST  (THORAX) WITH    (Results Pending)        COURSE & MEDICAL DECISION MAKING  Pertinent Labs & Imaging studies reviewed by me. (See chart for details)    48 y.o. female p/w CC of cough and SOB.     Differential diagnosis includes but is not limited to:    Pt w/ hx and PE concerning for #CAP.    Pt w/ b/l infiltrate seen in base of lungs.   Pt found to be hypoxic and has new 2L O2 requirement  Pt w/ sx improvement on O2  Pt given unasyn and azithro for CAP  Not c/w sepsis at this time however will continue to trend closely  Given pt appears clinically euvolemic decision made to hold fluids at this time    Doubt PE however will continue to monitor while in hospital and if no improvement of sx would consider broadening w/u  No e/o PTX  No hx of rib fx as underlying etiology  No further complaints at this time.   Denies dysuria, doubt UTI  Denies rash and no e/o cellulitis.     Plan for admit for further monitoring    FINAL IMPRESSION  Hypoxia  CAP         Electronically signed by: Diego Martinez, 8/6/2018 11:56 AM

## 2018-08-07 ENCOUNTER — APPOINTMENT (OUTPATIENT)
Dept: RADIOLOGY | Facility: MEDICAL CENTER | Age: 48
DRG: 871 | End: 2018-08-07
Attending: STUDENT IN AN ORGANIZED HEALTH CARE EDUCATION/TRAINING PROGRAM
Payer: MEDICAID

## 2018-08-07 PROBLEM — J14 PNEUMONIA DUE TO HAEMOPHILUS INFLUENZAE (HCC): Status: ACTIVE | Noted: 2018-08-07

## 2018-08-07 LAB
ALBUMIN SERPL BCP-MCNC: 3.6 G/DL (ref 3.2–4.9)
ALBUMIN/GLOB SERPL: 0.9 G/DL
ALP SERPL-CCNC: 88 U/L (ref 30–99)
ALT SERPL-CCNC: 14 U/L (ref 2–50)
ANION GAP SERPL CALC-SCNC: 8 MMOL/L (ref 0–11.9)
APPEARANCE UR: CLEAR
AST SERPL-CCNC: 19 U/L (ref 12–45)
BASOPHILS # BLD AUTO: 0.2 % (ref 0–1.8)
BASOPHILS # BLD: 0.03 K/UL (ref 0–0.12)
BILIRUB SERPL-MCNC: 1.7 MG/DL (ref 0.1–1.5)
BILIRUB UR QL STRIP.AUTO: ABNORMAL
BUN SERPL-MCNC: 12 MG/DL (ref 8–22)
CALCIUM SERPL-MCNC: 8.7 MG/DL (ref 8.5–10.5)
CHLORIDE SERPL-SCNC: 104 MMOL/L (ref 96–112)
CHOLEST SERPL-MCNC: 107 MG/DL (ref 100–199)
CO2 SERPL-SCNC: 24 MMOL/L (ref 20–33)
COLOR UR: ABNORMAL
CREAT SERPL-MCNC: 0.67 MG/DL (ref 0.5–1.4)
EOSINOPHIL # BLD AUTO: 0.07 K/UL (ref 0–0.51)
EOSINOPHIL NFR BLD: 0.6 % (ref 0–6.9)
ERYTHROCYTE [DISTWIDTH] IN BLOOD BY AUTOMATED COUNT: 42.2 FL (ref 35.9–50)
EST. AVERAGE GLUCOSE BLD GHB EST-MCNC: 157 MG/DL
GLOBULIN SER CALC-MCNC: 3.9 G/DL (ref 1.9–3.5)
GLUCOSE BLD-MCNC: 134 MG/DL (ref 65–99)
GLUCOSE BLD-MCNC: 155 MG/DL (ref 65–99)
GLUCOSE BLD-MCNC: 201 MG/DL (ref 65–99)
GLUCOSE SERPL-MCNC: 178 MG/DL (ref 65–99)
GLUCOSE UR STRIP.AUTO-MCNC: NEGATIVE MG/DL
HBA1C MFR BLD: 7.1 % (ref 0–5.6)
HCT VFR BLD AUTO: 40.6 % (ref 37–47)
HDLC SERPL-MCNC: 30 MG/DL
HGB BLD-MCNC: 13.7 G/DL (ref 12–16)
IMM GRANULOCYTES # BLD AUTO: 0.06 K/UL (ref 0–0.11)
IMM GRANULOCYTES NFR BLD AUTO: 0.5 % (ref 0–0.9)
KETONES UR STRIP.AUTO-MCNC: NEGATIVE MG/DL
LDLC SERPL CALC-MCNC: 64 MG/DL
LEUKOCYTE ESTERASE UR QL STRIP.AUTO: NEGATIVE
LYMPHOCYTES # BLD AUTO: 1.79 K/UL (ref 1–4.8)
LYMPHOCYTES NFR BLD: 14.6 % (ref 22–41)
MAGNESIUM SERPL-MCNC: 1.9 MG/DL (ref 1.5–2.5)
MCH RBC QN AUTO: 30.3 PG (ref 27–33)
MCHC RBC AUTO-ENTMCNC: 33.7 G/DL (ref 33.6–35)
MCV RBC AUTO: 89.8 FL (ref 81.4–97.8)
MICRO URNS: ABNORMAL
MONOCYTES # BLD AUTO: 0.9 K/UL (ref 0–0.85)
MONOCYTES NFR BLD AUTO: 7.3 % (ref 0–13.4)
NEUTROPHILS # BLD AUTO: 9.45 K/UL (ref 2–7.15)
NEUTROPHILS NFR BLD: 76.8 % (ref 44–72)
NITRITE UR QL STRIP.AUTO: NEGATIVE
NRBC # BLD AUTO: 0 K/UL
NRBC BLD-RTO: 0 /100 WBC
PH UR STRIP.AUTO: 6.5 [PH]
PHOSPHATE SERPL-MCNC: 2.8 MG/DL (ref 2.5–4.5)
PLATELET # BLD AUTO: 186 K/UL (ref 164–446)
PMV BLD AUTO: 10.2 FL (ref 9–12.9)
POTASSIUM SERPL-SCNC: 3.7 MMOL/L (ref 3.6–5.5)
PROT SERPL-MCNC: 7.5 G/DL (ref 6–8.2)
PROT UR QL STRIP: NEGATIVE MG/DL
RBC # BLD AUTO: 4.52 M/UL (ref 4.2–5.4)
RBC UR QL AUTO: NEGATIVE
SODIUM SERPL-SCNC: 136 MMOL/L (ref 135–145)
SP GR UR STRIP.AUTO: 1.02
TRIGL SERPL-MCNC: 67 MG/DL (ref 0–149)
UROBILINOGEN UR STRIP.AUTO-MCNC: >=8 MG/DL
WBC # BLD AUTO: 12.3 K/UL (ref 4.8–10.8)

## 2018-08-07 PROCEDURE — 80061 LIPID PANEL: CPT

## 2018-08-07 PROCEDURE — 700111 HCHG RX REV CODE 636 W/ 250 OVERRIDE (IP): Performed by: STUDENT IN AN ORGANIZED HEALTH CARE EDUCATION/TRAINING PROGRAM

## 2018-08-07 PROCEDURE — 770001 HCHG ROOM/CARE - MED/SURG/GYN PRIV*

## 2018-08-07 PROCEDURE — 71260 CT THORAX DX C+: CPT

## 2018-08-07 PROCEDURE — 80053 COMPREHEN METABOLIC PANEL: CPT

## 2018-08-07 PROCEDURE — 81003 URINALYSIS AUTO W/O SCOPE: CPT

## 2018-08-07 PROCEDURE — 83735 ASSAY OF MAGNESIUM: CPT

## 2018-08-07 PROCEDURE — 87086 URINE CULTURE/COLONY COUNT: CPT

## 2018-08-07 PROCEDURE — 82962 GLUCOSE BLOOD TEST: CPT

## 2018-08-07 PROCEDURE — A9270 NON-COVERED ITEM OR SERVICE: HCPCS | Performed by: STUDENT IN AN ORGANIZED HEALTH CARE EDUCATION/TRAINING PROGRAM

## 2018-08-07 PROCEDURE — 700102 HCHG RX REV CODE 250 W/ 637 OVERRIDE(OP): Performed by: STUDENT IN AN ORGANIZED HEALTH CARE EDUCATION/TRAINING PROGRAM

## 2018-08-07 PROCEDURE — 85025 COMPLETE CBC W/AUTO DIFF WBC: CPT

## 2018-08-07 PROCEDURE — 99232 SBSQ HOSP IP/OBS MODERATE 35: CPT | Mod: GC | Performed by: INTERNAL MEDICINE

## 2018-08-07 PROCEDURE — 84100 ASSAY OF PHOSPHORUS: CPT

## 2018-08-07 PROCEDURE — 700105 HCHG RX REV CODE 258: Performed by: STUDENT IN AN ORGANIZED HEALTH CARE EDUCATION/TRAINING PROGRAM

## 2018-08-07 PROCEDURE — 700117 HCHG RX CONTRAST REV CODE 255: Performed by: STUDENT IN AN ORGANIZED HEALTH CARE EDUCATION/TRAINING PROGRAM

## 2018-08-07 PROCEDURE — 36415 COLL VENOUS BLD VENIPUNCTURE: CPT

## 2018-08-07 RX ORDER — LISINOPRIL 20 MG/1
20 TABLET ORAL
Status: DISCONTINUED | OUTPATIENT
Start: 2018-08-08 | End: 2018-08-09

## 2018-08-07 RX ORDER — INSULIN GLARGINE 100 [IU]/ML
5 INJECTION, SOLUTION SUBCUTANEOUS EVERY EVENING
Status: DISCONTINUED | OUTPATIENT
Start: 2018-08-07 | End: 2018-08-08

## 2018-08-07 RX ADMIN — SODIUM CHLORIDE 3 G: 900 INJECTION INTRAVENOUS at 05:14

## 2018-08-07 RX ADMIN — INSULIN GLARGINE 5 UNITS: 100 INJECTION, SOLUTION SUBCUTANEOUS at 20:22

## 2018-08-07 RX ADMIN — LISINOPRIL 10 MG: 5 TABLET ORAL at 05:13

## 2018-08-07 RX ADMIN — SODIUM CHLORIDE 3 G: 900 INJECTION INTRAVENOUS at 12:29

## 2018-08-07 RX ADMIN — POTASSIUM BICARBONATE 25 MEQ: 25 TABLET, EFFERVESCENT ORAL at 07:37

## 2018-08-07 RX ADMIN — POTASSIUM BICARBONATE 25 MEQ: 25 TABLET, EFFERVESCENT ORAL at 17:48

## 2018-08-07 RX ADMIN — SODIUM CHLORIDE 3 G: 900 INJECTION INTRAVENOUS at 17:48

## 2018-08-07 RX ADMIN — SODIUM CHLORIDE 3 G: 900 INJECTION INTRAVENOUS at 23:17

## 2018-08-07 RX ADMIN — IOHEXOL 75 ML: 350 INJECTION, SOLUTION INTRAVENOUS at 11:46

## 2018-08-07 RX ADMIN — ENOXAPARIN SODIUM 40 MG: 100 INJECTION SUBCUTANEOUS at 05:14

## 2018-08-07 RX ADMIN — SODIUM CHLORIDE: 9 INJECTION, SOLUTION INTRAVENOUS at 23:17

## 2018-08-07 RX ADMIN — SODIUM CHLORIDE: 9 INJECTION, SOLUTION INTRAVENOUS at 05:19

## 2018-08-07 RX ADMIN — AZITHROMYCIN 250 MG: 250 TABLET, FILM COATED ORAL at 05:14

## 2018-08-07 ASSESSMENT — COGNITIVE AND FUNCTIONAL STATUS - GENERAL
DAILY ACTIVITIY SCORE: 24
MOBILITY SCORE: 24
SUGGESTED CMS G CODE MODIFIER DAILY ACTIVITY: CH
SUGGESTED CMS G CODE MODIFIER MOBILITY: CH

## 2018-08-07 ASSESSMENT — ENCOUNTER SYMPTOMS
SORE THROAT: 0
NECK PAIN: 0
ABDOMINAL PAIN: 0
HEMOPTYSIS: 0
SHORTNESS OF BREATH: 1
CLAUDICATION: 0
HALLUCINATIONS: 0
VOMITING: 0
DIZZINESS: 0
COUGH: 1
EYES NEGATIVE: 1
FOCAL WEAKNESS: 0
WHEEZING: 0
WEAKNESS: 0
ORTHOPNEA: 0
DIARRHEA: 0
BRUISES/BLEEDS EASILY: 0
FEVER: 0
BACK PAIN: 0
EYE PAIN: 0
FLANK PAIN: 0
BLOOD IN STOOL: 0
CONSTITUTIONAL NEGATIVE: 1
DIAPHORESIS: 0
WHEEZING: 1
CHILLS: 0
NAUSEA: 0
MYALGIAS: 0
SPUTUM PRODUCTION: 1
CARDIOVASCULAR NEGATIVE: 1
SEIZURES: 0
GASTROINTESTINAL NEGATIVE: 1
HEADACHES: 0
PALPITATIONS: 0

## 2018-08-07 ASSESSMENT — PAIN SCALES - GENERAL
PAINLEVEL_OUTOF10: 0

## 2018-08-07 NOTE — ASSESSMENT & PLAN NOTE
-Sputum culture: Haemophilus influenzae beta lactamase sensitive  -Augmentin- day 5/7 of antibiotic therapy  -encourage incentive spirometry and ambulation  -Tessalon pearles and Mucinex as needed for cough   -follow up with PCP with repeat CXR in two weeks

## 2018-08-07 NOTE — PROGRESS NOTES
0730: Bedside report received.  Pt AOx4, denies any pain or needs at this time.  Starting to titrate O2 down appropiately.  Pt remains on con pulse ox.  Call light and personal belongings within reach.  Bed locked in lowest position.    1125: Pt off unit to CT    1150: --[facility] blue paged as pt has no sliding scale

## 2018-08-07 NOTE — PROGRESS NOTES
Received report and care assumed. Patient A&Ox 4. Pt. Reports no pain. Work of breathing even and mildly labored on 4L. Discussed POC. Call light within reach and pt. instructed to call when in need of assistance.  Denies any other needs at this time.

## 2018-08-07 NOTE — PROGRESS NOTES
Internal Medicine Interval Note  Note Author: Ryan Gonzalez D.O.     Name Georgiana Michael 1970   Age/Sex 48 y.o. female   MRN [id number]   Code Status Full Code     After 5PM or if no immediate response to page, please call for cross-coverage  Attending/Team: Dr. Monteiro/West See Patient List for primary contact information  Call [phone] to page    1st Call - Day Intern (R1):   Dr. Gonzalez 2nd Call - Day Sr. Resident (R2/R3):   Dr. Shay         Reason for interval visit  (Principal Problem)   Sepsis secondary to pneumonia without end organ damage      Interval Problem Daily Status Update  (24 hours, problem oriented, brief subjective history, new lab/imaging data pertinent to that problem)   This morning patient is feeling significantly better than at the time of admission. She has been afebrile, non-tachycardic and non-tachypnic. She is now on 4L of oxygen. Patient states that her cough has decreased in intensity and her shortness of breath has improved. Her pleuritic chest pain and left hip pain have also resolved.     Review of Systems   Constitutional: Negative for chills, diaphoresis and fever.   Respiratory: Positive for cough, sputum production, shortness of breath and wheezing. Negative for hemoptysis.    Cardiovascular: Negative for chest pain, palpitations, orthopnea, claudication and leg swelling.   Gastrointestinal: Negative for abdominal pain, nausea and vomiting.   Genitourinary: Negative for dysuria, frequency and urgency.   Musculoskeletal: Negative for myalgias.   Neurological: Negative for dizziness, weakness and headaches.     Disposition/Barriers to discharge:   Inpatient being treated for sepsis secondary to Pneumonia     Consultants/Specialty  PCP: No primary care provider on file.    Quality Measures  Quality-Core Measures   Reviewed items::  EKG reviewed, Labs reviewed, Medications reviewed and Radiology images reviewed  DVT prophylaxis pharmacological::  Enoxaparin  (Lovenox)      Physical Exam       Vitals:    08/06/18 1540 08/06/18 1950 08/06/18 2355 08/07/18 0445   BP: 123/73 132/77 138/64 155/96   Pulse: 82 83 80 77   Resp: 18 18 18 18   Temp: 37.9 °C (100.2 °F) 37.7 °C (99.8 °F) 37.3 °C (99.2 °F) 37.2 °C (98.9 °F)   SpO2: 96% 95% 98% 97%   Weight:  (!) 183.4 kg (404 lb 5.2 oz)     Height:         Body mass index is 59.71 kg/m². Weight: (!) 183.4 kg (404 lb 5.2 oz)  Oxygen Therapy:  Pulse Oximetry: 97 %, O2 (LPM): 4, O2 Delivery: Silicone Nasal Cannula    Physical Exam    QTc-458    Assessment/Plan   Sepsis due to pneumonia (HCC) without end organ damage  -improving  -SIRS 3/4 on admission  -CT Chest: pneumonitis, right greater than left.  -IVF NS 75 mL/hr (5.5 L required per sepsis protocol )  - continue Unasyn and Azithromycin     Pneumonia due to Haemophilus influenzae (HCC)  -Sputum culture: Haemophilus influenzae  -awaiting sensitivities, continue Unasyn and Azithromycin  -encourage incentive spirometry and ambulation    Acute respiratory failure with hypoxia (HCC)  -secondary to suspected pneumonia   -continue to titrate oxygen to saturations >94%     Type 2 diabetes mellitus, without long-term current use of insulin (HCC)  -HbA1C 7.1  -Lipid panel (, TGL 67, HDL 30, LDL 64)   -patient has been off medications for the past three months   -Lantus 5u qhs     Essential hypertension  -elevated BPs (150s/160x/80s-90s)  -increased Lisinopril 20 mg and will reevaluate      Left hip pain  -present on admission, suspect secondary to body habitus and being bedridden  -resolved

## 2018-08-07 NOTE — RESPIRATORY CARE
COPD EDUCATION by COPD CLINICAL EDUCATOR  8/7/2018 at 8:29 AM by Galilea Segovia     Patient reviewed by COPD education team. Patient does not qualify for COPD program.

## 2018-08-07 NOTE — CARE PLAN
Problem: Safety  Goal: Will remain free from injury  Outcome: PROGRESSING AS EXPECTED  Pt remains free from falls or injury.  Pt uses call light appropriately.     Problem: Respiratory:  Goal: Respiratory status will improve    Intervention: Administer and titrate oxygen therapy  Titrate oxygen as able.  Pts baseline O2 is room air. Monitor on con pulse ox.

## 2018-08-07 NOTE — DIETARY
NUTRITION SERVICES: BMI - Pt with BMI >40 (= 59.71). Weight loss counseling not appropriate in acute care setting.     RECOMMEND - Referral to outpatient nutrition services for weight management after D/C.

## 2018-08-07 NOTE — PROGRESS NOTES
2 RN skin check:    Bilateral ears, elbows, heels and coccyx intact free from breakdown.  Pts skin has generalized dryness and callouses.

## 2018-08-07 NOTE — CARE PLAN
Problem: Infection  Goal: Will remain free from infection  Outcome: PROGRESSING AS EXPECTED  Patient will verbalize signs and symptoms of infection, interventions that will prevent infection, and when to notify a healthcare provider regarding signs and symptoms of infection.     Problem: Knowledge Deficit  Goal: Knowledge of disease process/condition, treatment plan, diagnostic tests, and medications will improve  Outcome: PROGRESSING AS EXPECTED  Knowledge of disease process, current condition, plan of care, medications, and diagnostics will improve.

## 2018-08-08 ENCOUNTER — APPOINTMENT (OUTPATIENT)
Dept: RADIOLOGY | Facility: MEDICAL CENTER | Age: 48
DRG: 871 | End: 2018-08-08
Attending: STUDENT IN AN ORGANIZED HEALTH CARE EDUCATION/TRAINING PROGRAM
Payer: MEDICAID

## 2018-08-08 PROBLEM — M25.552 LEFT HIP PAIN: Status: RESOLVED | Noted: 2018-08-06 | Resolved: 2018-08-08

## 2018-08-08 LAB
ANION GAP SERPL CALC-SCNC: 8 MMOL/L (ref 0–11.9)
BACTERIA SPEC RESP CULT: ABNORMAL
BACTERIA SPEC RESP CULT: ABNORMAL
BASOPHILS # BLD AUTO: 0.3 % (ref 0–1.8)
BASOPHILS # BLD AUTO: 0.3 % (ref 0–1.8)
BASOPHILS # BLD: 0.03 K/UL (ref 0–0.12)
BASOPHILS # BLD: 0.04 K/UL (ref 0–0.12)
BUN SERPL-MCNC: 11 MG/DL (ref 8–22)
CALCIUM SERPL-MCNC: 8.9 MG/DL (ref 8.5–10.5)
CHLORIDE SERPL-SCNC: 104 MMOL/L (ref 96–112)
CO2 SERPL-SCNC: 24 MMOL/L (ref 20–33)
CREAT SERPL-MCNC: 0.67 MG/DL (ref 0.5–1.4)
EOSINOPHIL # BLD AUTO: 0.15 K/UL (ref 0–0.51)
EOSINOPHIL # BLD AUTO: 0.17 K/UL (ref 0–0.51)
EOSINOPHIL NFR BLD: 1.3 % (ref 0–6.9)
EOSINOPHIL NFR BLD: 1.4 % (ref 0–6.9)
ERYTHROCYTE [DISTWIDTH] IN BLOOD BY AUTOMATED COUNT: 41.1 FL (ref 35.9–50)
ERYTHROCYTE [DISTWIDTH] IN BLOOD BY AUTOMATED COUNT: 41.4 FL (ref 35.9–50)
GLUCOSE BLD-MCNC: 121 MG/DL (ref 65–99)
GLUCOSE BLD-MCNC: 153 MG/DL (ref 65–99)
GLUCOSE SERPL-MCNC: 121 MG/DL (ref 65–99)
GRAM STN SPEC: ABNORMAL
HCT VFR BLD AUTO: 40.4 % (ref 37–47)
HCT VFR BLD AUTO: 41.7 % (ref 37–47)
HGB BLD-MCNC: 13.5 G/DL (ref 12–16)
HGB BLD-MCNC: 13.9 G/DL (ref 12–16)
IMM GRANULOCYTES # BLD AUTO: 0.05 K/UL (ref 0–0.11)
IMM GRANULOCYTES # BLD AUTO: 0.06 K/UL (ref 0–0.11)
IMM GRANULOCYTES NFR BLD AUTO: 0.4 % (ref 0–0.9)
IMM GRANULOCYTES NFR BLD AUTO: 0.5 % (ref 0–0.9)
LYMPHOCYTES # BLD AUTO: 1.67 K/UL (ref 1–4.8)
LYMPHOCYTES # BLD AUTO: 2.3 K/UL (ref 1–4.8)
LYMPHOCYTES NFR BLD: 14.2 % (ref 22–41)
LYMPHOCYTES NFR BLD: 19.5 % (ref 22–41)
MCH RBC QN AUTO: 29.9 PG (ref 27–33)
MCH RBC QN AUTO: 29.9 PG (ref 27–33)
MCHC RBC AUTO-ENTMCNC: 33.3 G/DL (ref 33.6–35)
MCHC RBC AUTO-ENTMCNC: 33.4 G/DL (ref 33.6–35)
MCV RBC AUTO: 89.4 FL (ref 81.4–97.8)
MCV RBC AUTO: 89.7 FL (ref 81.4–97.8)
MONOCYTES # BLD AUTO: 0.7 K/UL (ref 0–0.85)
MONOCYTES # BLD AUTO: 0.74 K/UL (ref 0–0.85)
MONOCYTES NFR BLD AUTO: 5.9 % (ref 0–13.4)
MONOCYTES NFR BLD AUTO: 6.3 % (ref 0–13.4)
NEUTROPHILS # BLD AUTO: 8.52 K/UL (ref 2–7.15)
NEUTROPHILS # BLD AUTO: 9.18 K/UL (ref 2–7.15)
NEUTROPHILS NFR BLD: 72.1 % (ref 44–72)
NEUTROPHILS NFR BLD: 77.8 % (ref 44–72)
NRBC # BLD AUTO: 0 K/UL
NRBC # BLD AUTO: 0 K/UL
NRBC BLD-RTO: 0 /100 WBC
NRBC BLD-RTO: 0 /100 WBC
PLATELET # BLD AUTO: 193 K/UL (ref 164–446)
PLATELET # BLD AUTO: 229 K/UL (ref 164–446)
PMV BLD AUTO: 10.1 FL (ref 9–12.9)
PMV BLD AUTO: 9.8 FL (ref 9–12.9)
POTASSIUM SERPL-SCNC: 3.9 MMOL/L (ref 3.6–5.5)
PROCALCITONIN SERPL-MCNC: <0.05 NG/ML
RBC # BLD AUTO: 4.52 M/UL (ref 4.2–5.4)
RBC # BLD AUTO: 4.65 M/UL (ref 4.2–5.4)
SIGNIFICANT IND 70042: ABNORMAL
SITE SITE: ABNORMAL
SODIUM SERPL-SCNC: 136 MMOL/L (ref 135–145)
SOURCE SOURCE: ABNORMAL
WBC # BLD AUTO: 11.8 K/UL (ref 4.8–10.8)
WBC # BLD AUTO: 11.8 K/UL (ref 4.8–10.8)

## 2018-08-08 PROCEDURE — 80048 BASIC METABOLIC PNL TOTAL CA: CPT

## 2018-08-08 PROCEDURE — A9270 NON-COVERED ITEM OR SERVICE: HCPCS | Performed by: STUDENT IN AN ORGANIZED HEALTH CARE EDUCATION/TRAINING PROGRAM

## 2018-08-08 PROCEDURE — 700111 HCHG RX REV CODE 636 W/ 250 OVERRIDE (IP): Performed by: STUDENT IN AN ORGANIZED HEALTH CARE EDUCATION/TRAINING PROGRAM

## 2018-08-08 PROCEDURE — A9270 NON-COVERED ITEM OR SERVICE: HCPCS | Performed by: INTERNAL MEDICINE

## 2018-08-08 PROCEDURE — 36415 COLL VENOUS BLD VENIPUNCTURE: CPT

## 2018-08-08 PROCEDURE — 71046 X-RAY EXAM CHEST 2 VIEWS: CPT

## 2018-08-08 PROCEDURE — 99232 SBSQ HOSP IP/OBS MODERATE 35: CPT | Mod: GC | Performed by: INTERNAL MEDICINE

## 2018-08-08 PROCEDURE — 85025 COMPLETE CBC W/AUTO DIFF WBC: CPT

## 2018-08-08 PROCEDURE — 700105 HCHG RX REV CODE 258: Performed by: STUDENT IN AN ORGANIZED HEALTH CARE EDUCATION/TRAINING PROGRAM

## 2018-08-08 PROCEDURE — 700102 HCHG RX REV CODE 250 W/ 637 OVERRIDE(OP): Performed by: STUDENT IN AN ORGANIZED HEALTH CARE EDUCATION/TRAINING PROGRAM

## 2018-08-08 PROCEDURE — 700102 HCHG RX REV CODE 250 W/ 637 OVERRIDE(OP): Performed by: INTERNAL MEDICINE

## 2018-08-08 PROCEDURE — 770001 HCHG ROOM/CARE - MED/SURG/GYN PRIV*

## 2018-08-08 PROCEDURE — 82962 GLUCOSE BLOOD TEST: CPT

## 2018-08-08 PROCEDURE — 84145 PROCALCITONIN (PCT): CPT

## 2018-08-08 RX ORDER — AMOXICILLIN AND CLAVULANATE POTASSIUM 875; 125 MG/1; MG/1
1 TABLET, FILM COATED ORAL EVERY 12 HOURS
Status: DISCONTINUED | OUTPATIENT
Start: 2018-08-08 | End: 2018-08-10 | Stop reason: HOSPADM

## 2018-08-08 RX ORDER — GUAIFENESIN 600 MG/1
600 TABLET, EXTENDED RELEASE ORAL 2 TIMES DAILY PRN
Status: DISCONTINUED | OUTPATIENT
Start: 2018-08-08 | End: 2018-08-10 | Stop reason: HOSPADM

## 2018-08-08 RX ORDER — BENZONATATE 100 MG/1
100 CAPSULE ORAL 3 TIMES DAILY PRN
Status: DISCONTINUED | OUTPATIENT
Start: 2018-08-08 | End: 2018-08-10 | Stop reason: HOSPADM

## 2018-08-08 RX ORDER — GUAIFENESIN 600 MG/1
600 TABLET, EXTENDED RELEASE ORAL 2 TIMES DAILY
Status: DISCONTINUED | OUTPATIENT
Start: 2018-08-08 | End: 2018-08-08

## 2018-08-08 RX ORDER — FUROSEMIDE 10 MG/ML
20 INJECTION INTRAMUSCULAR; INTRAVENOUS ONCE
Status: COMPLETED | OUTPATIENT
Start: 2018-08-08 | End: 2018-08-08

## 2018-08-08 RX ADMIN — SODIUM CHLORIDE 3 G: 900 INJECTION INTRAVENOUS at 04:58

## 2018-08-08 RX ADMIN — FUROSEMIDE 20 MG: 10 INJECTION, SOLUTION INTRAMUSCULAR; INTRAVENOUS at 15:27

## 2018-08-08 RX ADMIN — AMOXICILLIN AND CLAVULANATE POTASSIUM 1 TABLET: 875; 125 TABLET, FILM COATED ORAL at 17:22

## 2018-08-08 RX ADMIN — POTASSIUM BICARBONATE 25 MEQ: 25 TABLET, EFFERVESCENT ORAL at 17:22

## 2018-08-08 RX ADMIN — GUAIFENESIN 600 MG: 600 TABLET, EXTENDED RELEASE ORAL at 11:57

## 2018-08-08 RX ADMIN — METFORMIN HYDROCHLORIDE 500 MG: 500 TABLET ORAL at 17:22

## 2018-08-08 RX ADMIN — AZITHROMYCIN 250 MG: 250 TABLET, FILM COATED ORAL at 04:57

## 2018-08-08 RX ADMIN — POTASSIUM BICARBONATE 25 MEQ: 25 TABLET, EFFERVESCENT ORAL at 08:43

## 2018-08-08 RX ADMIN — ENOXAPARIN SODIUM 40 MG: 100 INJECTION SUBCUTANEOUS at 04:58

## 2018-08-08 RX ADMIN — LISINOPRIL 20 MG: 20 TABLET ORAL at 04:57

## 2018-08-08 RX ADMIN — BENZONATATE 100 MG: 100 CAPSULE ORAL at 11:57

## 2018-08-08 RX ADMIN — BENZONATATE 100 MG: 100 CAPSULE ORAL at 20:39

## 2018-08-08 RX ADMIN — SODIUM CHLORIDE 3 G: 900 INJECTION INTRAVENOUS at 12:01

## 2018-08-08 ASSESSMENT — ENCOUNTER SYMPTOMS
ABDOMINAL PAIN: 0
WHEEZING: 0
NAUSEA: 0
DIAPHORESIS: 0
WEAKNESS: 0
MYALGIAS: 0
SPUTUM PRODUCTION: 1
CLAUDICATION: 0
HEMOPTYSIS: 0
FEVER: 0
COUGH: 1
VOMITING: 0
DIZZINESS: 0
SHORTNESS OF BREATH: 1
HEADACHES: 0
PALPITATIONS: 0
ORTHOPNEA: 0
CHILLS: 0

## 2018-08-08 ASSESSMENT — PAIN SCALES - GENERAL
PAINLEVEL_OUTOF10: 0

## 2018-08-08 NOTE — PROGRESS NOTES
Internal Medicine Interval Note  Note Author: Ryan Gonzalez D.O.     Name Georgiana Michael 1970   Age/Sex 48 y.o. female   MRN [id number]   Code Status Full Code     After 5PM or if no immediate response to page, please call for cross-coverage  Attending/Team: Dr. Monteiro/West See Patient List for primary contact information  Call [phone] to page    1st Call - Day Intern (R1):   Dr. Gonzalez 2nd Call - Day Sr. Resident (R2/R3):   Dr. Shay        Reason for interval visit  (Principal Problem)   Sepsis secondary to pneumonia 2/2  Haemophilus influenzae without end organ damage with Acute respiratory failure requiring supplemental oxygen      Interval Problem Daily Status Update  (24 hours, problem oriented, brief subjective history, new lab/imaging data pertinent to that problem)   This morning patient reports feeling well. She still has productive cough, but continues to have dyspnea both at rest and with ambulation. Vitals have been stable and patient is now on 1L of oxygen. She denies any nausea, vomiting and chest pain. Patient's ambulation test today showed that patient was saturating at 85% on room air with ambulation and requires oxygen with ambulation.     Sputum culture: checked with Microbiology dept Haemophilus influenzae is b-lactamase sensitive    Review of Systems   Constitutional: Negative for chills, diaphoresis and fever.   Respiratory: Positive for cough, sputum production and shortness of breath. Negative for hemoptysis and wheezing.    Cardiovascular: Negative for chest pain, palpitations, orthopnea, claudication and leg swelling.   Gastrointestinal: Negative for abdominal pain, nausea and vomiting.   Genitourinary: Negative for dysuria, frequency and urgency.   Musculoskeletal: Negative for myalgias.   Neurological: Negative for dizziness, weakness and headaches.     Disposition/Barriers to discharge:   Inpatient being treated for sepsis secondary to  Haemophilus influenzae  pneumonia, anticipate discharge tomorrow.     Consultants/Specialty  PCP: No primary care provider on file.    Quality Measures  Quality-Core Measures   Reviewed items::  EKG reviewed, Labs reviewed, Medications reviewed and Radiology images reviewed  DVT prophylaxis pharmacological::  Enoxaparin (Lovenox)      Physical Exam       Vitals:    08/07/18 0909 08/07/18 1701 08/07/18 2018 08/08/18 0453   BP: (!) 167/81 155/67 153/90 (!) 168/97   Pulse: 81 77 76 80   Resp: 18 19 18 18   Temp: 37.3 °C (99.1 °F) 36.5 °C (97.7 °F) 36.3 °C (97.3 °F) 36.5 °C (97.7 °F)   SpO2: 92% 96% 94% 92%   Weight:   (!) 184.9 kg (407 lb 10.1 oz)    Height:         Body mass index is 60.2 kg/m². Weight: (!) 184.9 kg (407 lb 10.1 oz)  Oxygen Therapy:  Pulse Oximetry: 92 %, O2 (LPM): 1, O2 Delivery: Silicone Nasal Cannula    Physical Exam   Constitutional: She is oriented to person, place, and time. No distress.   Large body habitus, Sitting in chair   HENT:   Head: Normocephalic and atraumatic.   Neck: Normal range of motion.   Cardiovascular: Normal rate, regular rhythm, normal heart sounds and intact distal pulses.    No murmur heard.  Pulmonary/Chest: Effort normal. No accessory muscle usage. No respiratory distress.   Fine crackles on lower lung bases bilaterally   Abdominal: Soft. Bowel sounds are normal. She exhibits no distension. There is no tenderness. There is no rebound.   Musculoskeletal: Normal range of motion.   Neurological: She is alert and oriented to person, place, and time.   Skin: Skin is warm and dry. She is not diaphoretic.     QTc-458    Assessment/Plan   Sepsis due to pneumonia (HCC) without end organ damage-improving  -SIRS 3/4 on admission  -CT Chest: pneumonitis, right greater than left.  -discontinued IV Fluids  - replaced Unasyn and Azithromycin with Augmentin   - Day 3/7 of antibiotic therapy    Pneumonia due to Haemophilus influenzae (HCC)  -Sputum culture: Haemophilus influenzae  -awaiting sensitivities,  continue Unasyn and Azithromycin  -encourage incentive spirometry and ambulation  -Tessalon pearles and Mucinex as needed for cough   -follow up with PCP with repeat CXR in two weeks     Acute respiratory failure with hypoxia (HCC)  -secondary to suspected pneumonia   -continue to titrate oxygen to saturations >94%  -possible fluid overloaded, will give one time dose of 20mg IV Lasix and obtain CXR    Type 2 diabetes mellitus, without long-term current use of insulin (HCC)  -HbA1C 7.1  -Lipid panel (, TGL 67, HDL 30, LDL 64)   -patient has been off medications for the past three months   -will restart Metformin 500mg BID     Essential hypertension  -elevated BPs (140s/80s)  -Lisinopril 20 mg- received first dose this morning     Left hip pain  -present on admission, suspect secondary to body habitus and being bedridden  -resolved

## 2018-08-08 NOTE — CARE PLAN
Problem: Safety  Goal: Will remain free from injury    Intervention: Provide assistance with mobility  Pt will remain free from falls or injury      Problem: Infection  Goal: Will remain free from infection    Intervention: Assess signs and symptoms of infection  Pt will state 2 s/sx of infection and when to call the MD.

## 2018-08-08 NOTE — PROGRESS NOTES
0830: Bedside report recevied.  Pt AOx4, denies any pain or needs at this time.  Resting in bed comfortably, wants to sleep longer.  Call light and personal belongings within reach.  Bed locked in lowest position.    1130: Walking O2 eval completed--pt remained on 1L to maintain O2 sat >90%.  Frequently coughing. MD notified. See eMAR for PRN admin.

## 2018-08-08 NOTE — CARE PLAN
Problem: Infection  Goal: Will remain free from infection  Outcome: PROGRESSING AS EXPECTED  Patient will verbalize signs and symptoms of infection (especially respiratory system), interventions that can help prevent infection, and when to notify a healthcare provider regarding signs and symptoms of infection.     Problem: Knowledge Deficit  Goal: Knowledge of disease process/condition, treatment plan, diagnostic tests, and medications will improve  Outcome: PROGRESSING AS EXPECTED  Knowledge of disease process, current condition, plan of care, medications, and diagnostics will improve.

## 2018-08-08 NOTE — PROGRESS NOTES
Received report and care assumed. Patient A&Ox 4. Pt. Reports no pain. Work of breathing even and unlabored on 1L, down from 4L previous night. Discussed POC. Call light within reach and pt. instructed to call when in need of assistance.  Denies any other needs at this time.

## 2018-08-09 ENCOUNTER — PATIENT OUTREACH (OUTPATIENT)
Dept: HEALTH INFORMATION MANAGEMENT | Facility: OTHER | Age: 48
End: 2018-08-09

## 2018-08-09 LAB
ANION GAP SERPL CALC-SCNC: 8 MMOL/L (ref 0–11.9)
BACTERIA UR CULT: NORMAL
BASOPHILS # BLD AUTO: 0.2 % (ref 0–1.8)
BASOPHILS # BLD: 0.02 K/UL (ref 0–0.12)
BUN SERPL-MCNC: 11 MG/DL (ref 8–22)
CALCIUM SERPL-MCNC: 9.2 MG/DL (ref 8.5–10.5)
CHLORIDE SERPL-SCNC: 99 MMOL/L (ref 96–112)
CO2 SERPL-SCNC: 27 MMOL/L (ref 20–33)
CREAT SERPL-MCNC: 0.82 MG/DL (ref 0.5–1.4)
EOSINOPHIL # BLD AUTO: 0.16 K/UL (ref 0–0.51)
EOSINOPHIL NFR BLD: 1.7 % (ref 0–6.9)
ERYTHROCYTE [DISTWIDTH] IN BLOOD BY AUTOMATED COUNT: 39.1 FL (ref 35.9–50)
GLUCOSE SERPL-MCNC: 126 MG/DL (ref 65–99)
HCT VFR BLD AUTO: 40 % (ref 37–47)
HGB BLD-MCNC: 13.7 G/DL (ref 12–16)
IMM GRANULOCYTES # BLD AUTO: 0.03 K/UL (ref 0–0.11)
IMM GRANULOCYTES NFR BLD AUTO: 0.3 % (ref 0–0.9)
LYMPHOCYTES # BLD AUTO: 2 K/UL (ref 1–4.8)
LYMPHOCYTES NFR BLD: 21.7 % (ref 22–41)
MCH RBC QN AUTO: 30.1 PG (ref 27–33)
MCHC RBC AUTO-ENTMCNC: 34.3 G/DL (ref 33.6–35)
MCV RBC AUTO: 87.9 FL (ref 81.4–97.8)
MONOCYTES # BLD AUTO: 0.65 K/UL (ref 0–0.85)
MONOCYTES NFR BLD AUTO: 7 % (ref 0–13.4)
NEUTROPHILS # BLD AUTO: 6.37 K/UL (ref 2–7.15)
NEUTROPHILS NFR BLD: 69.1 % (ref 44–72)
NRBC # BLD AUTO: 0 K/UL
NRBC BLD-RTO: 0 /100 WBC
PLATELET # BLD AUTO: 217 K/UL (ref 164–446)
PMV BLD AUTO: 9.6 FL (ref 9–12.9)
POTASSIUM SERPL-SCNC: 3.9 MMOL/L (ref 3.6–5.5)
RBC # BLD AUTO: 4.55 M/UL (ref 4.2–5.4)
SIGNIFICANT IND 70042: NORMAL
SITE SITE: NORMAL
SODIUM SERPL-SCNC: 134 MMOL/L (ref 135–145)
SOURCE SOURCE: NORMAL
WBC # BLD AUTO: 9.2 K/UL (ref 4.8–10.8)

## 2018-08-09 PROCEDURE — 770001 HCHG ROOM/CARE - MED/SURG/GYN PRIV*

## 2018-08-09 PROCEDURE — 99232 SBSQ HOSP IP/OBS MODERATE 35: CPT | Mod: GC | Performed by: INTERNAL MEDICINE

## 2018-08-09 PROCEDURE — 700102 HCHG RX REV CODE 250 W/ 637 OVERRIDE(OP): Performed by: STUDENT IN AN ORGANIZED HEALTH CARE EDUCATION/TRAINING PROGRAM

## 2018-08-09 PROCEDURE — 700111 HCHG RX REV CODE 636 W/ 250 OVERRIDE (IP): Performed by: STUDENT IN AN ORGANIZED HEALTH CARE EDUCATION/TRAINING PROGRAM

## 2018-08-09 PROCEDURE — 80048 BASIC METABOLIC PNL TOTAL CA: CPT

## 2018-08-09 PROCEDURE — A9270 NON-COVERED ITEM OR SERVICE: HCPCS | Performed by: STUDENT IN AN ORGANIZED HEALTH CARE EDUCATION/TRAINING PROGRAM

## 2018-08-09 PROCEDURE — 36415 COLL VENOUS BLD VENIPUNCTURE: CPT

## 2018-08-09 PROCEDURE — 85025 COMPLETE CBC W/AUTO DIFF WBC: CPT

## 2018-08-09 RX ORDER — LISINOPRIL 20 MG/1
40 TABLET ORAL
Status: DISCONTINUED | OUTPATIENT
Start: 2018-08-09 | End: 2018-08-10 | Stop reason: HOSPADM

## 2018-08-09 RX ORDER — FUROSEMIDE 10 MG/ML
20 INJECTION INTRAMUSCULAR; INTRAVENOUS ONCE
Status: COMPLETED | OUTPATIENT
Start: 2018-08-09 | End: 2018-08-09

## 2018-08-09 RX ORDER — FUROSEMIDE 10 MG/ML
20 INJECTION INTRAMUSCULAR; INTRAVENOUS
Status: DISCONTINUED | OUTPATIENT
Start: 2018-08-09 | End: 2018-08-10

## 2018-08-09 RX ADMIN — ENOXAPARIN SODIUM 40 MG: 100 INJECTION SUBCUTANEOUS at 06:31

## 2018-08-09 RX ADMIN — AMOXICILLIN AND CLAVULANATE POTASSIUM 1 TABLET: 875; 125 TABLET, FILM COATED ORAL at 06:28

## 2018-08-09 RX ADMIN — AMOXICILLIN AND CLAVULANATE POTASSIUM 1 TABLET: 875; 125 TABLET, FILM COATED ORAL at 16:53

## 2018-08-09 RX ADMIN — LISINOPRIL 40 MG: 20 TABLET ORAL at 06:28

## 2018-08-09 RX ADMIN — METFORMIN HYDROCHLORIDE 500 MG: 500 TABLET ORAL at 09:08

## 2018-08-09 RX ADMIN — FUROSEMIDE 20 MG: 10 INJECTION, SOLUTION INTRAMUSCULAR; INTRAVENOUS at 11:37

## 2018-08-09 RX ADMIN — METFORMIN HYDROCHLORIDE 500 MG: 500 TABLET ORAL at 16:53

## 2018-08-09 RX ADMIN — FUROSEMIDE 20 MG: 10 INJECTION, SOLUTION INTRAMUSCULAR; INTRAVENOUS at 16:52

## 2018-08-09 ASSESSMENT — ENCOUNTER SYMPTOMS
HEADACHES: 0
ABDOMINAL PAIN: 0
VOMITING: 0
FOCAL WEAKNESS: 0
COUGH: 1
DIZZINESS: 0
SHORTNESS OF BREATH: 0
NAUSEA: 0
CHILLS: 0
BACK PAIN: 0
WHEEZING: 0
DEPRESSION: 0
FEVER: 0

## 2018-08-09 ASSESSMENT — PATIENT HEALTH QUESTIONNAIRE - PHQ9
SUM OF ALL RESPONSES TO PHQ9 QUESTIONS 1 AND 2: 0
2. FEELING DOWN, DEPRESSED, IRRITABLE, OR HOPELESS: NOT AT ALL
1. LITTLE INTEREST OR PLEASURE IN DOING THINGS: NOT AT ALL

## 2018-08-09 ASSESSMENT — PAIN SCALES - GENERAL
PAINLEVEL_OUTOF10: 0

## 2018-08-09 NOTE — FACE TO FACE
Face to Face Note  -  Durable Medical Equipment    Bettina Cee M.D. - NPI: [id number]  I certify that this patient is under my care and that they have had a durable medical equipment(DME)face to face encounter by myself that meets the physician DME face-to-face encounter requirements with this patient on:    Date of encounter:   Patient:                    MRN:                       YOB: 2017  Georgiana Michael  [id number]  1970     The encounter with the patient was in whole, or in part, for the following medical condition, which is the primary reason for durable medical equipment:  Other - Pneumonia    I certify that, based on my findings, the following durable medical equipment is medically necessary:  Oxygen.    HOME O2 Saturation Measurements:(Values must be present for Home Oxygen orders)  Room air sat at rest: 88  Room air sat with amb: 85  With liters of O2: 1, O2 sat at rest with O2: 91  With Liters of O2: 1, O2 sat with amb with O2 : 89  Is the patient mobile?: Yes    My Clinical findings support the need for the above equipment due to:  Hypoxia    Supporting Symptoms: Patient desaturation to 80s with activity. Will require oxygen.

## 2018-08-09 NOTE — PROGRESS NOTES
Received report and care assumed. Patient A&Ox 4. Pt. Reports no pain. Work of breathing even and mildly labored with exertion on 1L nasal canula. Discussed POC. Call light within reach and pt. instructed to call when in need of assistance.  Denies any other needs at this time.

## 2018-08-09 NOTE — DISCHARGE SUMMARY
Internal Medicine Discharge Summary  Note Author: Bettina Cee M.D.       Name Georgiana Michael       1970   Age/Sex 48 y.o. female   MRN [id number]         Admit Date:  2018       Discharge Date:   8/10/18    Service:   [facility] Internal Medicine Blue Team  Attending Physician(s):   Dr Monteiro      Senior Resident(s):   Dr Shay   Lee Resident(s):   Dr Gonzalez   PCP: No primary care provider on file.      Primary Diagnosis:   Sepsis secondary to pneumonia secondary to Haemophilus influenzae without end organ damage with Acute respiratory failure requiring supplemental oxygen    Secondary Diagnoses:                  Acute respiratory failure with hypoxia    Sepsis due to pneumonia     Type 2 diabetes mellitus, without long-term current use of insulin     Essential hypertension     Pneumonia due to Haemophilus influenzae     Left hip pain       Hospital Summary (Brief Narrative):       Ms. Michael is a 48 year-old female who presented to the ED with a three week history of worsening cough and a one week history of progressive dyspnea at rest. On evaluation patient found to have Sepsis (3/4) secondary to Pneumonia. Patient CXR was unremarkable, but her symptoms and presentation were compatible with Pneumonia. D-dimer was ordered to r/o pulmonary embolism, but patient had low risk for it. D-dimer and procalcitonin were anthony normal. A CT chest with contrats was done and showed multifocal centrilobular opacities consistent with pneumonitis, right greater than left. Patient was started on IV fluids, Unasyn and azithromycin, and she was requiring 4L of oxygen. The next day patient condition improved significantly and her oxygen requirements drop to 2L also patient cough improved. The day before discharge patient was still requiring 1L of oxygen even after 2 doses of IV lasix 20mg. Patient saturation drop to 85 while walking. Patient dischaeged on Augmentin to complete 7 days of antibiotics.  Patient was still requiring oxygen with ambulation, the family brought the oxygen from home. The concentrator was working, patient oriented on how to use the oxygen.     Patient also has history of Hypertension, but was not using her medications. Patient was started on lisinopril and dose adjusted as needed. Also patient has history of diabetes mellitus type 2 and was using metformin. Patient was started on Lantus 5 units at the beginning of the hospital stay and then transition back to metformin.     Patient /Hospital Summary (Details -- Problem Oriented) :          Pneumonia due to Haemophilus influenzae (HCC)   Assessment & Plan    -Sputum culture: Haemophilus influenzae b-lactamase sensitive  - continue Azithromycin to complete 7 days   -encourage incentive spirometry and ambulation  -Tessalon pearles and Mucinex as needed for cough   -follow up with PCP with repeat CXR in two weeks             Essential hypertension   Assessment & Plan    - still has elevated BP   - lisinopril dose increased.   - will consider starting additional medication for better control         Type 2 diabetes mellitus, without long-term current use of insulin (HCC)   Assessment & Plan    -HbA1C 7.1  -Lipid panel (, TGL 67, HDL 30, LDL 64)   -patient has been off medications for the past three months   - Continue  Metformin 500mg BID           Sepsis due to pneumonia (HCC)   Assessment & Plan    This is sepsis (without associated acute organ dysfunction).   -SIRS 3/4 on admission  -CT Chest: pneumonitis, right greater than left.  -discontinued IV Fluids  - Continue Augmentin   - Day 4/7 of antibiotic therapy  - RESOLVED         Acute respiratory failure with hypoxia (HCC)   Assessment & Plan    -secondary to pneumonia   -continue to titrate oxygen to saturations >94%  - on ambulation patient desaturates to 85 and requires 1L oxygen   - will give another dose of IV lasix   - reevaluate in the morning   - encourage ambulation and  incentive spirometry           Left hip pain-resolved as of 8/8/2018   Assessment & Plan    -present on admission, suspect secondary to body habitus and being bedridden  -resolved            Consultants:     None     Procedures:        None     Imaging/ Testing:      DX-CHEST-2 VIEWS   Final Result      Bilateral interstitial prominence, right greater than left.      CT-CHEST (THORAX) WITH   Final Result      The multifocal centrilobular opacities consistent with pneumonitis, right greater than left.            DX-CHEST-PORTABLE (1 VIEW)   Final Result         No acute cardiac or pulmonary abnormality is identified.          Discharge Medications:         Medication Reconciliation: Completed       Medication List      START taking these medications      Instructions   amLODIPine 5 MG Tabs  Commonly known as:  NORVASC   Take 1 Tab by mouth every day.  Dose:  5 mg     amoxicillin-clavulanate 875-125 MG Tabs  Commonly known as:  AUGMENTIN   Doctor's comments:  Take until 8/12/18  Take 1 Tab by mouth every 12 hours.  Dose:  1 Tab     guaiFENesin  MG Tb12  Commonly known as:  MUCINEX   Take 1 Tab by mouth 2 times a day as needed for Cough.  Dose:  600 mg        CHANGE how you take these medications      Instructions   lisinopril 40 MG tablet  What changed:  · medication strength  · how much to take  Commonly known as:  PRINIVIL, ZESTRIL   Take 1 Tab by mouth every day.  Dose:  40 mg        CONTINUE taking these medications      Instructions   ibuprofen 200 MG Tabs  Commonly known as:  MOTRIN   Take 400 mg by mouth every 6 hours as needed for Mild Pain.  Dose:  400 mg     metFORMIN 500 MG Tabs  Commonly known as:  GLUCOPHAGE   Take 1 Tab by mouth 2 times a day, with meals.  Dose:  500 mg            Can use .DISCHARGEMEDSLIST if going to another facility         Disposition:   Discharge Home     Diet:   Diabetic     Activity:   As tolerated     Instructions:      The patient was instructed to return to the ER in the  event of worsening symptoms. I have counseled the patient on the importance of compliance and the patient has agreed to proceed with all medical recommendations and follow up plan indicated above.   The patient understands that all medications come with benefits and risks. Risks may include permanent injury or death and these risks can be minimized with close reassessment and monitoring.        Primary Care Provider:      Discharge summary faxed to primary care provider:  Deferred  Copy of discharge summary given to the patient: Deferred      Follow up appointment details :      Patient instructed to make appointment with PCP for follow up     Pending Studies:        CXR in 2 weeks     Time spent on discharge day patient visit, preparing discharge paperwork and arranging for patient follow up.    Summary of follow up issues:   1. Patient needs a repeat CXR in 2 weeks for evaluation of resolution of pneumonia   2. Follow up with PCP for better control of blood pressure   3. Follow up with PCP for sleep study   4. Follow up with PCP for possible bariatric surgery     Discharge Time (Minutes) :    60 min   Hospital Course Type:  Inpatient Stay >2 midnights      Condition on Discharge  Stable   ______________________________________________________________________    Interval history/exam for day of discharge:     No acute events over night.   Patient stable for discharge home     Physical Exam   Constitutional: She is oriented to person, place, and time and well-developed, well-nourished, and in no distress.   HENT:   Head: Normocephalic and atraumatic.   Eyes: Pupils are equal, round, and reactive to light. Conjunctivae and EOM are normal. Right eye exhibits no discharge. Left eye exhibits no discharge.   Neck: Normal range of motion. Neck supple.   Cardiovascular: Normal rate, regular rhythm and normal heart sounds.    Pulmonary/Chest: Effort normal and breath sounds normal. No respiratory distress. She has no  wheezes.   Abdominal: Soft. Bowel sounds are normal. She exhibits no distension. There is no tenderness.   Musculoskeletal: Normal range of motion. She exhibits no edema.   Neurological: She is alert and oriented to person, place, and time.   Skin: Skin is warm.   Psychiatric: Affect normal.     Most Recent Labs:    Lab Results   Component Value Date/Time    WBC 9.2 08/09/2018 02:28 AM    RBC 4.55 08/09/2018 02:28 AM    HEMOGLOBIN 13.7 08/09/2018 02:28 AM    HEMATOCRIT 40.0 08/09/2018 02:28 AM    MCV 87.9 08/09/2018 02:28 AM    MCH 30.1 08/09/2018 02:28 AM    MCHC 34.3 08/09/2018 02:28 AM    MPV 9.6 08/09/2018 02:28 AM    NEUTSPOLYS 69.10 08/09/2018 02:28 AM    LYMPHOCYTES 21.70 (L) 08/09/2018 02:28 AM    MONOCYTES 7.00 08/09/2018 02:28 AM    EOSINOPHILS 1.70 08/09/2018 02:28 AM    BASOPHILS 0.20 08/09/2018 02:28 AM      Lab Results   Component Value Date/Time    SODIUM 134 (L) 08/09/2018 02:28 AM    POTASSIUM 3.9 08/09/2018 02:28 AM    CHLORIDE 99 08/09/2018 02:28 AM    CO2 27 08/09/2018 02:28 AM    GLUCOSE 126 (H) 08/09/2018 02:28 AM    BUN 11 08/09/2018 02:28 AM    CREATININE 0.82 08/09/2018 02:28 AM      Lab Results   Component Value Date/Time    ALTSGPT 14 08/07/2018 02:44 AM    ASTSGOT 19 08/07/2018 02:44 AM    ALKPHOSPHAT 88 08/07/2018 02:44 AM    TBILIRUBIN 1.7 (H) 08/07/2018 02:44 AM    ALBUMIN 3.6 08/07/2018 02:44 AM    GLOBULIN 3.9 (H) 08/07/2018 02:44 AM     No results found for: PROTHROMBTM, INR

## 2018-08-09 NOTE — PROGRESS NOTES
Internal Medicine Interval Note  Note Author: Bettina Cee M.D.     Name Georgiana Michael 1970   Age/Sex 48 y.o. female   MRN [id number]   Code Status Full code      After 5PM or if no immediate response to page, please call for cross-coverage  Attending/Team: [place]  See Patient List for primary contact information  Call [phone] to page    1st Call - Day Intern (R1):   Lisa 2nd Call - Day Sr. Resident (R2/R3):   Jaspal          Reason for interval visit  (Principal Problem)   Sepsis secondary to pneumonia 2/2  Haemophilus influenzae without end organ damage with Acute respiratory failure requiring supplemental oxygen      Interval Problem Daily Status Update  (24 hours, problem oriented, brief subjective history, new lab/imaging data pertinent to that problem)   No acute events overnight.   Patient feels much better.   On evaluation for supplemental oxygen requirement patient desaturated to 85 while walking. Patient does not have insurance and is not able to pay at this time. Patient will stay another night, increase ambulation, encourage use ox incentive spirometry and an additional dose of IV lasix.      Review of Systems   Constitutional: Negative for chills and fever.   HENT: Negative for congestion.    Respiratory: Positive for cough. Negative for shortness of breath and wheezing.    Cardiovascular: Negative for chest pain and leg swelling.   Gastrointestinal: Negative for abdominal pain, nausea and vomiting.   Musculoskeletal: Negative for back pain.   Skin: Negative for rash.   Neurological: Negative for dizziness, focal weakness and headaches.   Psychiatric/Behavioral: Negative for depression.       Disposition/Barriers to discharge:   Hypoxia secondary to Pneumonia still requiring supplemental oxygen     Consultants/Specialty  None   PCP: No primary care provider on file.      Quality Measures  Quality-Core Measures   Reviewed items::  Medications reviewed and Labs  reviewed  Baker catheter::  No Baker  DVT prophylaxis pharmacological::  Enoxaparin (Lovenox)        Physical Exam       Vitals:    08/08/18 1600 08/08/18 2027 08/09/18 0453 08/09/18 0945   BP: 152/102 (!) 179/104 (!) 172/87 (!) 172/101   Pulse: 81 84 95 81   Resp: 18 18 18 20   Temp: 35.9 °C (96.7 °F) 36.6 °C (97.9 °F) 36.6 °C (97.8 °F) 36.6 °C (97.8 °F)   SpO2: 92% 92% 94% 92%   Weight:  (!) 182.5 kg (402 lb 5.4 oz)     Height:         Body mass index is 59.42 kg/m². Weight: (!) 182.5 kg (402 lb 5.4 oz)  Oxygen Therapy:  Pulse Oximetry: 92 %, O2 (LPM): 1, O2 Delivery: Nasal Cannula    Physical Exam   Constitutional: She is oriented to person, place, and time and well-developed, well-nourished, and in no distress.   HENT:   Head: Normocephalic and atraumatic.   Eyes: Pupils are equal, round, and reactive to light. Conjunctivae and EOM are normal. Right eye exhibits no discharge. Left eye exhibits no discharge.   Neck: Normal range of motion. Neck supple.   Cardiovascular: Normal rate, regular rhythm and normal heart sounds.    Pulmonary/Chest: Effort normal and breath sounds normal. No respiratory distress. She has no wheezes.   Abdominal: Soft. Bowel sounds are normal. She exhibits no distension. There is no tenderness.   Musculoskeletal: Normal range of motion. She exhibits no edema.   Neurological: She is alert and oriented to person, place, and time.   Skin: Skin is warm.   Psychiatric: Affect normal.   Nursing note and vitals reviewed.        Assessment/Plan     Pneumonia due to Haemophilus influenzae (HCC)   Assessment & Plan    -Sputum culture: Haemophilus influenzae b-lactamase sensitive  - continue Azithromycin to complete 7 days   -encourage incentive spirometry and ambulation  -Tessalon pearles and Mucinex as needed for cough   -follow up with PCP with repeat CXR in two weeks             Essential hypertension- (present on admission)   Assessment & Plan    - still has elevated BP   - lisinopril dose  increased.   - will consider starting additional medication for better control         Type 2 diabetes mellitus, without long-term current use of insulin (HCC)- (present on admission)   Assessment & Plan    -HbA1C 7.1  -Lipid panel (, TGL 67, HDL 30, LDL 64)   -patient has been off medications for the past three months   - Continue  Metformin 500mg BID           Sepsis due to pneumonia (HCC)- (present on admission)   Assessment & Plan    This is sepsis (without associated acute organ dysfunction).   -SIRS 3/4 on admission  -CT Chest: pneumonitis, right greater than left.  -discontinued IV Fluids  - Continue Augmentin   - Day 4/7 of antibiotic therapy  - RESOLVED         Acute respiratory failure with hypoxia (HCC)- (present on admission)   Assessment & Plan    -secondary to pneumonia   -continue to titrate oxygen to saturations >94%  - on ambulation patient desaturates to 85 and requires 1L oxygen   - will give another dose of IV lasix   - reevaluate in the morning   - encourage ambulation and incentive spirometry

## 2018-08-10 VITALS
WEIGHT: 293 LBS | HEART RATE: 74 BPM | OXYGEN SATURATION: 95 % | BODY MASS INDEX: 43.4 KG/M2 | SYSTOLIC BLOOD PRESSURE: 107 MMHG | TEMPERATURE: 97.1 F | RESPIRATION RATE: 18 BRPM | DIASTOLIC BLOOD PRESSURE: 69 MMHG | HEIGHT: 69 IN

## 2018-08-10 PROBLEM — J18.9 SEPSIS DUE TO PNEUMONIA (HCC): Status: RESOLVED | Noted: 2018-08-06 | Resolved: 2018-08-10

## 2018-08-10 PROBLEM — A41.9 SEPSIS DUE TO PNEUMONIA (HCC): Status: RESOLVED | Noted: 2018-08-06 | Resolved: 2018-08-10

## 2018-08-10 PROCEDURE — 700111 HCHG RX REV CODE 636 W/ 250 OVERRIDE (IP): Performed by: STUDENT IN AN ORGANIZED HEALTH CARE EDUCATION/TRAINING PROGRAM

## 2018-08-10 PROCEDURE — 700102 HCHG RX REV CODE 250 W/ 637 OVERRIDE(OP): Performed by: STUDENT IN AN ORGANIZED HEALTH CARE EDUCATION/TRAINING PROGRAM

## 2018-08-10 PROCEDURE — 99238 HOSP IP/OBS DSCHRG MGMT 30/<: CPT | Mod: GC | Performed by: INTERNAL MEDICINE

## 2018-08-10 PROCEDURE — A9270 NON-COVERED ITEM OR SERVICE: HCPCS | Performed by: STUDENT IN AN ORGANIZED HEALTH CARE EDUCATION/TRAINING PROGRAM

## 2018-08-10 RX ORDER — GUAIFENESIN 600 MG/1
600 TABLET, EXTENDED RELEASE ORAL 2 TIMES DAILY PRN
Qty: 28 TAB | Refills: 0 | Status: SHIPPED | OUTPATIENT
Start: 2018-08-10

## 2018-08-10 RX ORDER — AMOXICILLIN AND CLAVULANATE POTASSIUM 875; 125 MG/1; MG/1
1 TABLET, FILM COATED ORAL EVERY 12 HOURS
Qty: 5 TAB | Refills: 0 | Status: SHIPPED | OUTPATIENT
Start: 2018-08-10

## 2018-08-10 RX ORDER — AMLODIPINE BESYLATE 5 MG/1
5 TABLET ORAL
Status: DISCONTINUED | OUTPATIENT
Start: 2018-08-10 | End: 2018-08-10 | Stop reason: HOSPADM

## 2018-08-10 RX ORDER — LISINOPRIL 40 MG/1
40 TABLET ORAL DAILY
Qty: 30 TAB | Refills: 0 | Status: SHIPPED | OUTPATIENT
Start: 2018-08-11

## 2018-08-10 RX ORDER — AMLODIPINE BESYLATE 5 MG/1
5 TABLET ORAL DAILY
Qty: 30 TAB | Refills: 0 | Status: SHIPPED | OUTPATIENT
Start: 2018-08-11

## 2018-08-10 RX ADMIN — AMLODIPINE BESYLATE 5 MG: 5 TABLET ORAL at 08:47

## 2018-08-10 RX ADMIN — ENOXAPARIN SODIUM 40 MG: 100 INJECTION SUBCUTANEOUS at 05:14

## 2018-08-10 RX ADMIN — METFORMIN HYDROCHLORIDE 500 MG: 500 TABLET ORAL at 09:10

## 2018-08-10 RX ADMIN — FUROSEMIDE 20 MG: 10 INJECTION, SOLUTION INTRAMUSCULAR; INTRAVENOUS at 05:14

## 2018-08-10 RX ADMIN — LISINOPRIL 40 MG: 20 TABLET ORAL at 05:14

## 2018-08-10 RX ADMIN — AMOXICILLIN AND CLAVULANATE POTASSIUM 1 TABLET: 875; 125 TABLET, FILM COATED ORAL at 05:14

## 2018-08-10 ASSESSMENT — ENCOUNTER SYMPTOMS
HEADACHES: 0
SPUTUM PRODUCTION: 1
MYALGIAS: 0
CHILLS: 0
DIZZINESS: 0
WEAKNESS: 0
WHEEZING: 0
PALPITATIONS: 0
COUGH: 1
VOMITING: 0
CLAUDICATION: 0
ORTHOPNEA: 0
HEMOPTYSIS: 0
NAUSEA: 0
ABDOMINAL PAIN: 0
FEVER: 0
DIAPHORESIS: 0

## 2018-08-10 ASSESSMENT — PATIENT HEALTH QUESTIONNAIRE - PHQ9
1. LITTLE INTEREST OR PLEASURE IN DOING THINGS: NOT AT ALL
SUM OF ALL RESPONSES TO PHQ9 QUESTIONS 1 AND 2: 0
2. FEELING DOWN, DEPRESSED, IRRITABLE, OR HOPELESS: NOT AT ALL

## 2018-08-10 ASSESSMENT — PAIN SCALES - GENERAL
PAINLEVEL_OUTOF10: 0
PAINLEVEL_OUTOF10: 0

## 2018-08-10 NOTE — DISCHARGE INSTRUCTIONS
Discharge Instructions    Discharged to home by car with relative. Discharged via wheelchair, hospital escort: Yes.  Special equipment needed: Not Applicable    Be sure to schedule a follow-up appointment with your primary care doctor or any specialists as instructed.     Discharge Plan:   Diet Plan: Discussed  Activity Level: Discussed  Smoking Cessation Offered: Patient Refused  Confirmed Follow up Appointment: Appointment Scheduled  Confirmed Symptoms Management: Discussed  Medication Reconciliation Updated: Yes  Influenza Vaccine Indication: Not indicated: Previously immunized this influenza season and > 8 years of age    I understand that a diet low in cholesterol, fat, and sodium is recommended for good health. Unless I have been given specific instructions below for another diet, I accept this instruction as my diet prescription.   Other diet: Cardiac    Special Instructions: None    · Is patient discharged on Warfarin / Coumadin?   No     Depression / Suicide Risk    As you are discharged from this [facility] facility, it is important to learn how to keep safe from harming yourself.    Recognize the warning signs:  · Abrupt changes in personality, positive or negative- including increase in energy   · Giving away possessions  · Change in eating patterns- significant weight changes-  positive or negative  · Change in sleeping patterns- unable to sleep or sleeping all the time   · Unwillingness or inability to communicate  · Depression  · Unusual sadness, discouragement and loneliness  · Talk of wanting to die  · Neglect of personal appearance   · Rebelliousness- reckless behavior  · Withdrawal from people/activities they love  · Confusion- inability to concentrate     If you or a loved one observes any of these behaviors or has concerns about self-harm, here's what you can do:  · Talk about it- your feelings and reasons for harming yourself  · Remove any means that you might use to hurt yourself (examples:  pills, rope, extension cords, firearm)  · Get professional help from the community (Mental Health, Substance Abuse, psychological counseling)  · Do not be alone:Call your Safe Contact- someone whom you trust who will be there for you.  · Call your local CRISIS HOTLINE 688-8937 or 964-394-0406  · Call your local Children's Mobile Crisis Response Team [place] (697) 400-5152 or www.TEVIZZ  · Call the toll free National Suicide Prevention Hotlines   · National Suicide Prevention Lifeline 043-559-BJOU (2940)  · National Hope Line Network 800-SUICIDE (534-3619)

## 2018-08-10 NOTE — PROGRESS NOTES
Internal Medicine Interval Note  Note Author: Ryan Gonzalez D.O.     Name Georgiana Michael 1970   Age/Sex 48 y.o. female   MRN [id number]   Code Status Full Code     After 5PM or if no immediate response to page, please call for cross-coverage  Attending/Team: Dr. Monteiro/West See Patient List for primary contact information  Call [phone] to page    1st Call - Day Intern (R1):   Dr. Gonzalez 2nd Call - Day Sr. Resident (R2/R3):   Dr. Shay        Reason for interval visit  (Principal Problem)   Sepsis secondary to pneumonia 2/2  Haemophilus influenzae without end organ damage with Acute respiratory failure requiring supplemental oxygen      Interval Problem Daily Status Update  (24 hours, problem oriented, brief subjective history, new lab/imaging data pertinent to that problem)   This morning, patient reports she is feeling great and ready for discharge to go home. Patient still has productive cough but is now with clear sputum. Patient was requiring supplemental oxygen while ambulating during the repeat walk test today. Patient is uninsured so she cannot obtain an oxygen tank. Patient's brother uses supplemental oxygen at home and the daughter will bring it in, for us to verify that the oxygen tank is operational and then can be discharged safely home with that oxygen tank.     Blood pressures have still been elevated >140/70s.      Review of Systems   Constitutional: Negative for chills, diaphoresis and fever.   Respiratory: Positive for cough and sputum production. Negative for hemoptysis and wheezing.         Dyspnea with exertion   Cardiovascular: Negative for chest pain, palpitations, orthopnea, claudication and leg swelling.   Gastrointestinal: Negative for abdominal pain, nausea and vomiting.   Genitourinary: Negative for dysuria, frequency and urgency.   Musculoskeletal: Negative for myalgias.   Neurological: Negative for dizziness, weakness and headaches.     Disposition/Barriers to  discharge:   Inpatient being treated for sepsis secondary to  Haemophilus influenzae pneumonia requiring supplemental oxygen, anticipate discharge today.     Consultants/Specialty  PCP: No primary care provider on file.    Quality Measures  Quality-Core Measures   Reviewed items::  EKG reviewed, Labs reviewed, Medications reviewed and Radiology images reviewed  DVT prophylaxis pharmacological::  Enoxaparin (Lovenox)      Physical Exam       Vitals:    08/09/18 1715 08/09/18 2055 08/09/18 2100 08/10/18 0504   BP: 143/98 151/98  139/76   Pulse: 79 77  82   Resp: 20 20  20   Temp: 36.4 °C (97.5 °F) 36.7 °C (98.1 °F)  36.6 °C (97.9 °F)   SpO2: 92% 96%  93%   Weight:   (!) 183.4 kg (404 lb 5.2 oz)    Height:         Body mass index is 59.71 kg/m². Weight: (!) 183.4 kg (404 lb 5.2 oz)  Oxygen Therapy:  Pulse Oximetry: 93 %, O2 (LPM): 2, O2 Delivery: Nasal Cannula    Physical Exam   Constitutional: She is oriented to person, place, and time. No distress.   Large body habitus, Sitting in chair   HENT:   Head: Normocephalic and atraumatic.   Neck: Normal range of motion.   Cardiovascular: Normal rate, regular rhythm, normal heart sounds and intact distal pulses.    No murmur heard.  Pulmonary/Chest: Effort normal. No accessory muscle usage. No respiratory distress.   Coarse breath sounds bilaterally in lower lung bases   Abdominal: Soft. Bowel sounds are normal. She exhibits no distension. There is no tenderness. There is no rebound.   Musculoskeletal: Normal range of motion.   Neurological: She is alert and oriented to person, place, and time.   Skin: Skin is warm and dry. She is not diaphoretic.     QTc-458    Assessment/Plan   Pneumonia due to Haemophilus influenzae (HCC)-improving  -Sputum culture: Haemophilus influenzae beta lactamase sensitive  -Augmentin- day 5/7 of antibiotic therapy  -encourage incentive spirometry and ambulation  -Tessalon pearles and Mucinex as needed for cough   -follow up with PCP with repeat  CXR in two weeks     Acute respiratory failure with hypoxia (HCC)-improved  -secondary to pneumonia   -continue to titrate oxygen to saturations >94%  -encourage ambulation and incentive spirometry  -repeat ambulation test today showing patient desaturating to 84% with ambulation without supplemental oxygen    Sepsis due to pneumonia (HCC) without end organ damage-resolved  -SIRS 3/4 on admission  -CT Chest: pneumonitis, right greater than left.  - Augmentin antibiotic therapy, Day 5/7   -now resolved    Type 2 diabetes mellitus, without long-term current use of insulin ([facility])  -HbA1C 7.1  -Lipid panel (, TGL 67, HDL 30, LDL 64)   -patient has been off medications for the past three months   -continue Metformin 500mg BID  -follow up outpt     Essential hypertension  -elevated BPs   -Lisinopril 40mg and started Amlodipine 5mg  -follow up outpatient

## 2018-08-10 NOTE — PROGRESS NOTES
Patient brought in brother's oxygen concentrator. Assessed medical device with nurse, Hemanth Estrada. The concentrator is functional and in working order. Patient will be provided tubing for nasal cannula and patient is aware on how to use the oxygen concentrator. Patient is medically clear for discharge.

## 2018-08-11 LAB
BACTERIA BLD CULT: NORMAL
BACTERIA BLD CULT: NORMAL
SIGNIFICANT IND 70042: NORMAL
SIGNIFICANT IND 70042: NORMAL
SITE SITE: NORMAL
SITE SITE: NORMAL
SOURCE SOURCE: NORMAL
SOURCE SOURCE: NORMAL

## 2018-08-11 NOTE — PROGRESS NOTES
Pt daughters arrived, pt escorted off the floor via wheelchair to daughters waiting vehicle. End of treatment.

## 2018-08-14 NOTE — ADDENDUM NOTE
Encounter addended by: Cathy Simpson R.N. on: 8/14/2018  2:30 PM<BR>    Actions taken: Flowsheet accepted

## 2018-11-20 LAB — EKG IMPRESSION: NORMAL

## 2020-02-21 NOTE — ED NOTES
Med Rec complete per Pt at bedside  Allergies Reviewed  No ABX in the last 30 days     eceived liver transplant evaluation referral called [facility] [phone] talked to Dayna in intake  Faxed clinical to  pending authorization [id number]

## 2023-12-18 NOTE — ASSESSMENT & PLAN NOTE
-present on admission, suspect secondary to body habitus and being bedridden  -resolved   Quiescent and still followed by Endo on a yearly basis.

## 2024-05-08 NOTE — PROGRESS NOTES
Message sent to GI in alternate encounter   Pt to be dc'd to home, pt stated feeling well and ready to go.  PIV in RAC dc'd, cath tip intact, pt tolerated with min pain and min bleeding, pressure held at insertion site for 2-3 mins until bleeding stopped then gauze and tape dressing applied.  DC edu provided on pt meds, home/self care, s/s to call physician, s/s to return to ER, dietary restrictions, activity restrictions, fall prevention in the home, infection control in the home, importance of establishing with a new primary physician, and f/u appts.  Pt stated understanding and signed dc paperwork.  Pt's daughter is on the way, will escort pt down when daughter arrives.